# Patient Record
Sex: FEMALE | Employment: UNEMPLOYED | URBAN - METROPOLITAN AREA
[De-identification: names, ages, dates, MRNs, and addresses within clinical notes are randomized per-mention and may not be internally consistent; named-entity substitution may affect disease eponyms.]

---

## 2019-09-26 ENCOUNTER — HOSPITAL ENCOUNTER (INPATIENT)
Age: 84
LOS: 6 days | Discharge: NURSING, REHAB OR LONG-TERM CARE | DRG: 078 | End: 2019-10-02
Attending: EMERGENCY MEDICINE | Admitting: INTERNAL MEDICINE
Payer: MEDICARE

## 2019-09-26 ENCOUNTER — APPOINTMENT (OUTPATIENT)
Dept: CT IMAGING | Age: 84
DRG: 078 | End: 2019-09-26
Payer: MEDICARE

## 2019-09-26 ENCOUNTER — APPOINTMENT (OUTPATIENT)
Dept: GENERAL RADIOLOGY | Age: 84
DRG: 078 | End: 2019-09-26
Payer: MEDICARE

## 2019-09-26 DIAGNOSIS — I16.0 HYPERTENSIVE URGENCY: Primary | ICD-10-CM

## 2019-09-26 PROBLEM — I10 UNCONTROLLED HYPERTENSION: Status: ACTIVE | Noted: 2019-09-26

## 2019-09-26 LAB
A/G RATIO: 1.5 (ref 1.1–2.2)
ALBUMIN SERPL-MCNC: 4.5 G/DL (ref 3.4–5)
ALP BLD-CCNC: 103 U/L (ref 40–129)
ALT SERPL-CCNC: 15 U/L (ref 10–40)
ANION GAP SERPL CALCULATED.3IONS-SCNC: 18 MMOL/L (ref 3–16)
AST SERPL-CCNC: 22 U/L (ref 15–37)
BASOPHILS ABSOLUTE: 0 K/UL (ref 0–0.2)
BASOPHILS RELATIVE PERCENT: 0.3 %
BILIRUB SERPL-MCNC: 0.3 MG/DL (ref 0–1)
BILIRUBIN URINE: NEGATIVE
BLOOD, URINE: ABNORMAL
BUN BLDV-MCNC: 47 MG/DL (ref 7–20)
CALCIUM SERPL-MCNC: 9.7 MG/DL (ref 8.3–10.6)
CHLORIDE BLD-SCNC: 105 MMOL/L (ref 99–110)
CLARITY: CLEAR
CO2: 24 MMOL/L (ref 21–32)
COLOR: YELLOW
CREAT SERPL-MCNC: 1.6 MG/DL (ref 0.6–1.2)
EOSINOPHILS ABSOLUTE: 0 K/UL (ref 0–0.6)
EOSINOPHILS RELATIVE PERCENT: 0.2 %
EPITHELIAL CELLS, UA: 1 /HPF (ref 0–5)
GFR AFRICAN AMERICAN: 37
GFR NON-AFRICAN AMERICAN: 30
GLOBULIN: 3.1 G/DL
GLUCOSE BLD-MCNC: 157 MG/DL (ref 70–99)
GLUCOSE URINE: NEGATIVE MG/DL
HCT VFR BLD CALC: 34.9 % (ref 36–48)
HEMOGLOBIN: 11.8 G/DL (ref 12–16)
HYALINE CASTS: 1 /LPF (ref 0–8)
KETONES, URINE: NEGATIVE MG/DL
LACTIC ACID: 1.3 MMOL/L (ref 0.4–2)
LEUKOCYTE ESTERASE, URINE: NEGATIVE
LYMPHOCYTES ABSOLUTE: 0.7 K/UL (ref 1–5.1)
LYMPHOCYTES RELATIVE PERCENT: 11.9 %
MCH RBC QN AUTO: 32 PG (ref 26–34)
MCHC RBC AUTO-ENTMCNC: 33.8 G/DL (ref 31–36)
MCV RBC AUTO: 94.6 FL (ref 80–100)
MICROSCOPIC EXAMINATION: YES
MONOCYTES ABSOLUTE: 0.3 K/UL (ref 0–1.3)
MONOCYTES RELATIVE PERCENT: 5 %
NEUTROPHILS ABSOLUTE: 4.8 K/UL (ref 1.7–7.7)
NEUTROPHILS RELATIVE PERCENT: 82.6 %
NITRITE, URINE: NEGATIVE
PDW BLD-RTO: 13.7 % (ref 12.4–15.4)
PH UA: 6.5 (ref 5–8)
PLATELET # BLD: 147 K/UL (ref 135–450)
PMV BLD AUTO: 9.6 FL (ref 5–10.5)
POTASSIUM REFLEX MAGNESIUM: 4.8 MMOL/L (ref 3.5–5.1)
PRO-BNP: 1369 PG/ML (ref 0–449)
PROTEIN UA: >=300 MG/DL
RBC # BLD: 3.68 M/UL (ref 4–5.2)
RBC UA: 14 /HPF (ref 0–4)
SODIUM BLD-SCNC: 147 MMOL/L (ref 136–145)
SPECIFIC GRAVITY UA: 1.02 (ref 1–1.03)
TOTAL CK: 108 U/L (ref 26–192)
TOTAL PROTEIN: 7.6 G/DL (ref 6.4–8.2)
TROPONIN: 0.02 NG/ML
URINE REFLEX TO CULTURE: ABNORMAL
URINE TYPE: ABNORMAL
UROBILINOGEN, URINE: 1 E.U./DL
WBC # BLD: 5.9 K/UL (ref 4–11)
WBC UA: 1 /HPF (ref 0–5)

## 2019-09-26 PROCEDURE — 83880 ASSAY OF NATRIURETIC PEPTIDE: CPT

## 2019-09-26 PROCEDURE — 2500000003 HC RX 250 WO HCPCS: Performed by: EMERGENCY MEDICINE

## 2019-09-26 PROCEDURE — 81001 URINALYSIS AUTO W/SCOPE: CPT

## 2019-09-26 PROCEDURE — 70450 CT HEAD/BRAIN W/O DYE: CPT

## 2019-09-26 PROCEDURE — 84484 ASSAY OF TROPONIN QUANT: CPT

## 2019-09-26 PROCEDURE — 83605 ASSAY OF LACTIC ACID: CPT

## 2019-09-26 PROCEDURE — 6370000000 HC RX 637 (ALT 250 FOR IP): Performed by: EMERGENCY MEDICINE

## 2019-09-26 PROCEDURE — 1200000000 HC SEMI PRIVATE

## 2019-09-26 PROCEDURE — 85025 COMPLETE CBC W/AUTO DIFF WBC: CPT

## 2019-09-26 PROCEDURE — 99285 EMERGENCY DEPT VISIT HI MDM: CPT

## 2019-09-26 PROCEDURE — 71045 X-RAY EXAM CHEST 1 VIEW: CPT

## 2019-09-26 PROCEDURE — 96374 THER/PROPH/DIAG INJ IV PUSH: CPT

## 2019-09-26 PROCEDURE — 80053 COMPREHEN METABOLIC PANEL: CPT

## 2019-09-26 PROCEDURE — 82550 ASSAY OF CK (CPK): CPT

## 2019-09-26 PROCEDURE — 93005 ELECTROCARDIOGRAM TRACING: CPT | Performed by: EMERGENCY MEDICINE

## 2019-09-26 RX ORDER — AMLODIPINE BESYLATE 5 MG/1
10 TABLET ORAL ONCE
Status: COMPLETED | OUTPATIENT
Start: 2019-09-26 | End: 2019-09-26

## 2019-09-26 RX ORDER — LISINOPRIL 10 MG/1
10 TABLET ORAL ONCE
Status: DISCONTINUED | OUTPATIENT
Start: 2019-09-26 | End: 2019-09-26

## 2019-09-26 RX ORDER — AMLODIPINE BESYLATE 5 MG/1
10 TABLET ORAL DAILY
Status: DISCONTINUED | OUTPATIENT
Start: 2019-09-27 | End: 2019-09-26

## 2019-09-26 RX ORDER — LABETALOL HYDROCHLORIDE 5 MG/ML
20 INJECTION, SOLUTION INTRAVENOUS ONCE
Status: COMPLETED | OUTPATIENT
Start: 2019-09-26 | End: 2019-09-26

## 2019-09-26 RX ORDER — HYDRALAZINE HYDROCHLORIDE 20 MG/ML
5 INJECTION INTRAMUSCULAR; INTRAVENOUS ONCE
Status: DISCONTINUED | OUTPATIENT
Start: 2019-09-26 | End: 2019-09-26

## 2019-09-26 RX ADMIN — AMLODIPINE BESYLATE 10 MG: 5 TABLET ORAL at 22:49

## 2019-09-26 RX ADMIN — LABETALOL HYDROCHLORIDE 20 MG: 5 INJECTION, SOLUTION INTRAVENOUS at 22:00

## 2019-09-27 PROBLEM — I16.0 HYPERTENSIVE URGENCY: Status: ACTIVE | Noted: 2019-09-27

## 2019-09-27 LAB
ANION GAP SERPL CALCULATED.3IONS-SCNC: 17 MMOL/L (ref 3–16)
BUN BLDV-MCNC: 41 MG/DL (ref 7–20)
CALCIUM SERPL-MCNC: 9.9 MG/DL (ref 8.3–10.6)
CHLORIDE BLD-SCNC: 102 MMOL/L (ref 99–110)
CO2: 24 MMOL/L (ref 21–32)
CREAT SERPL-MCNC: 1.2 MG/DL (ref 0.6–1.2)
EKG ATRIAL RATE: 82 BPM
EKG ATRIAL RATE: 89 BPM
EKG DIAGNOSIS: NORMAL
EKG DIAGNOSIS: NORMAL
EKG P AXIS: 29 DEGREES
EKG P AXIS: 43 DEGREES
EKG P-R INTERVAL: 162 MS
EKG P-R INTERVAL: 180 MS
EKG Q-T INTERVAL: 408 MS
EKG Q-T INTERVAL: 446 MS
EKG QRS DURATION: 84 MS
EKG QRS DURATION: 90 MS
EKG QTC CALCULATION (BAZETT): 496 MS
EKG QTC CALCULATION (BAZETT): 521 MS
EKG R AXIS: 32 DEGREES
EKG R AXIS: 51 DEGREES
EKG T AXIS: -38 DEGREES
EKG T AXIS: 12 DEGREES
EKG VENTRICULAR RATE: 82 BPM
EKG VENTRICULAR RATE: 89 BPM
GFR AFRICAN AMERICAN: 51
GFR NON-AFRICAN AMERICAN: 42
GLUCOSE BLD-MCNC: 170 MG/DL (ref 70–99)
POTASSIUM REFLEX MAGNESIUM: 3.8 MMOL/L (ref 3.5–5.1)
SODIUM BLD-SCNC: 143 MMOL/L (ref 136–145)
TROPONIN: 0.06 NG/ML
TROPONIN: 0.07 NG/ML

## 2019-09-27 PROCEDURE — 92610 EVALUATE SWALLOWING FUNCTION: CPT

## 2019-09-27 PROCEDURE — 93010 ELECTROCARDIOGRAM REPORT: CPT | Performed by: INTERNAL MEDICINE

## 2019-09-27 PROCEDURE — 6360000002 HC RX W HCPCS: Performed by: INTERNAL MEDICINE

## 2019-09-27 PROCEDURE — 93005 ELECTROCARDIOGRAM TRACING: CPT | Performed by: NURSE PRACTITIONER

## 2019-09-27 PROCEDURE — 36415 COLL VENOUS BLD VENIPUNCTURE: CPT

## 2019-09-27 PROCEDURE — 94760 N-INVAS EAR/PLS OXIMETRY 1: CPT

## 2019-09-27 PROCEDURE — 2580000003 HC RX 258: Performed by: INTERNAL MEDICINE

## 2019-09-27 PROCEDURE — 6370000000 HC RX 637 (ALT 250 FOR IP): Performed by: INTERNAL MEDICINE

## 2019-09-27 PROCEDURE — 1200000000 HC SEMI PRIVATE

## 2019-09-27 PROCEDURE — 6370000000 HC RX 637 (ALT 250 FOR IP): Performed by: NURSE PRACTITIONER

## 2019-09-27 PROCEDURE — 80048 BASIC METABOLIC PNL TOTAL CA: CPT

## 2019-09-27 PROCEDURE — 99223 1ST HOSP IP/OBS HIGH 75: CPT | Performed by: INTERNAL MEDICINE

## 2019-09-27 PROCEDURE — 84484 ASSAY OF TROPONIN QUANT: CPT

## 2019-09-27 RX ORDER — ONDANSETRON 2 MG/ML
4 INJECTION INTRAMUSCULAR; INTRAVENOUS EVERY 6 HOURS PRN
Status: DISCONTINUED | OUTPATIENT
Start: 2019-09-27 | End: 2019-10-02 | Stop reason: HOSPADM

## 2019-09-27 RX ORDER — HYDRALAZINE HYDROCHLORIDE 20 MG/ML
10 INJECTION INTRAMUSCULAR; INTRAVENOUS EVERY 6 HOURS PRN
Status: DISCONTINUED | OUTPATIENT
Start: 2019-09-27 | End: 2019-09-30

## 2019-09-27 RX ORDER — LABETALOL 100 MG/1
50 TABLET, FILM COATED ORAL EVERY 8 HOURS SCHEDULED
Status: DISCONTINUED | OUTPATIENT
Start: 2019-09-27 | End: 2019-09-27

## 2019-09-27 RX ORDER — LABETALOL HYDROCHLORIDE 5 MG/ML
10 INJECTION, SOLUTION INTRAVENOUS EVERY 4 HOURS PRN
Status: DISCONTINUED | OUTPATIENT
Start: 2019-09-27 | End: 2019-09-30

## 2019-09-27 RX ORDER — ACETAMINOPHEN 325 MG/1
650 TABLET ORAL EVERY 4 HOURS PRN
Status: DISCONTINUED | OUTPATIENT
Start: 2019-09-27 | End: 2019-10-02 | Stop reason: HOSPADM

## 2019-09-27 RX ORDER — LABETALOL 100 MG/1
100 TABLET, FILM COATED ORAL EVERY 12 HOURS SCHEDULED
Status: DISCONTINUED | OUTPATIENT
Start: 2019-09-27 | End: 2019-10-01

## 2019-09-27 RX ORDER — SODIUM CHLORIDE 0.9 % (FLUSH) 0.9 %
10 SYRINGE (ML) INJECTION EVERY 12 HOURS SCHEDULED
Status: DISCONTINUED | OUTPATIENT
Start: 2019-09-27 | End: 2019-10-02 | Stop reason: HOSPADM

## 2019-09-27 RX ORDER — LABETALOL 100 MG/1
25 TABLET, FILM COATED ORAL EVERY 8 HOURS SCHEDULED
Status: DISCONTINUED | OUTPATIENT
Start: 2019-09-27 | End: 2019-09-27 | Stop reason: DRUGHIGH

## 2019-09-27 RX ORDER — AMLODIPINE BESYLATE 10 MG/1
10 TABLET ORAL DAILY
Status: DISCONTINUED | OUTPATIENT
Start: 2019-09-27 | End: 2019-09-27

## 2019-09-27 RX ORDER — AMLODIPINE BESYLATE 5 MG/1
5 TABLET ORAL DAILY
Status: DISCONTINUED | OUTPATIENT
Start: 2019-09-28 | End: 2019-10-01

## 2019-09-27 RX ORDER — FLUOXETINE 10 MG/1
10 CAPSULE ORAL DAILY
Status: DISCONTINUED | OUTPATIENT
Start: 2019-09-27 | End: 2019-10-02 | Stop reason: HOSPADM

## 2019-09-27 RX ORDER — SODIUM CHLORIDE 0.9 % (FLUSH) 0.9 %
10 SYRINGE (ML) INJECTION PRN
Status: DISCONTINUED | OUTPATIENT
Start: 2019-09-27 | End: 2019-10-02 | Stop reason: HOSPADM

## 2019-09-27 RX ORDER — QUETIAPINE FUMARATE 25 MG/1
12.5 TABLET, FILM COATED ORAL 2 TIMES DAILY PRN
Status: DISCONTINUED | OUTPATIENT
Start: 2019-09-27 | End: 2019-09-30

## 2019-09-27 RX ORDER — LABETALOL 100 MG/1
50 TABLET, FILM COATED ORAL EVERY 12 HOURS SCHEDULED
Status: DISCONTINUED | OUTPATIENT
Start: 2019-09-27 | End: 2019-09-27

## 2019-09-27 RX ORDER — ASPIRIN 81 MG/1
81 TABLET, CHEWABLE ORAL DAILY
Status: DISCONTINUED | OUTPATIENT
Start: 2019-09-27 | End: 2019-10-02 | Stop reason: HOSPADM

## 2019-09-27 RX ORDER — QUETIAPINE FUMARATE 25 MG/1
25 TABLET, FILM COATED ORAL NIGHTLY
Status: DISCONTINUED | OUTPATIENT
Start: 2019-09-27 | End: 2019-09-30

## 2019-09-27 RX ADMIN — HYDRALAZINE HYDROCHLORIDE 10 MG: 20 INJECTION INTRAMUSCULAR; INTRAVENOUS at 18:36

## 2019-09-27 RX ADMIN — AMLODIPINE BESYLATE 10 MG: 10 TABLET ORAL at 09:41

## 2019-09-27 RX ADMIN — LABETALOL HYDROCHLORIDE 100 MG: 100 TABLET, FILM COATED ORAL at 20:50

## 2019-09-27 RX ADMIN — HYDRALAZINE HYDROCHLORIDE 10 MG: 20 INJECTION INTRAMUSCULAR; INTRAVENOUS at 01:14

## 2019-09-27 RX ADMIN — FLUOXETINE 10 MG: 10 CAPSULE ORAL at 20:50

## 2019-09-27 RX ADMIN — Medication 10 ML: at 09:42

## 2019-09-27 RX ADMIN — Medication 10 ML: at 20:57

## 2019-09-27 RX ADMIN — METOPROLOL TARTRATE 25 MG: 25 TABLET ORAL at 09:41

## 2019-09-27 RX ADMIN — QUETIAPINE FUMARATE 25 MG: 25 TABLET ORAL at 20:50

## 2019-09-27 RX ADMIN — ASPIRIN 81 MG 81 MG: 81 TABLET ORAL at 09:41

## 2019-09-27 ASSESSMENT — PAIN SCALES - WONG BAKER
WONGBAKER_NUMERICALRESPONSE: 0
WONGBAKER_NUMERICALRESPONSE: 0

## 2019-09-28 LAB
ANION GAP SERPL CALCULATED.3IONS-SCNC: 16 MMOL/L (ref 3–16)
BASOPHILS ABSOLUTE: 0 K/UL (ref 0–0.2)
BASOPHILS RELATIVE PERCENT: 0.5 %
BUN BLDV-MCNC: 44 MG/DL (ref 7–20)
CALCIUM SERPL-MCNC: 9.6 MG/DL (ref 8.3–10.6)
CHLORIDE BLD-SCNC: 107 MMOL/L (ref 99–110)
CO2: 23 MMOL/L (ref 21–32)
CREAT SERPL-MCNC: 1.8 MG/DL (ref 0.6–1.2)
EOSINOPHILS ABSOLUTE: 0 K/UL (ref 0–0.6)
EOSINOPHILS RELATIVE PERCENT: 0.5 %
GFR AFRICAN AMERICAN: 32
GFR NON-AFRICAN AMERICAN: 26
GLUCOSE BLD-MCNC: 89 MG/DL (ref 70–99)
HCT VFR BLD CALC: 35.2 % (ref 36–48)
HEMOGLOBIN: 11.8 G/DL (ref 12–16)
LYMPHOCYTES ABSOLUTE: 1.5 K/UL (ref 1–5.1)
LYMPHOCYTES RELATIVE PERCENT: 24.5 %
MCH RBC QN AUTO: 31.9 PG (ref 26–34)
MCHC RBC AUTO-ENTMCNC: 33.4 G/DL (ref 31–36)
MCV RBC AUTO: 95.6 FL (ref 80–100)
MONOCYTES ABSOLUTE: 0.4 K/UL (ref 0–1.3)
MONOCYTES RELATIVE PERCENT: 7.1 %
NEUTROPHILS ABSOLUTE: 4.2 K/UL (ref 1.7–7.7)
NEUTROPHILS RELATIVE PERCENT: 67.4 %
PDW BLD-RTO: 13.9 % (ref 12.4–15.4)
PLATELET # BLD: 157 K/UL (ref 135–450)
PMV BLD AUTO: 9.6 FL (ref 5–10.5)
POTASSIUM REFLEX MAGNESIUM: 4.2 MMOL/L (ref 3.5–5.1)
RBC # BLD: 3.68 M/UL (ref 4–5.2)
SODIUM BLD-SCNC: 146 MMOL/L (ref 136–145)
WBC # BLD: 6.2 K/UL (ref 4–11)

## 2019-09-28 PROCEDURE — 6370000000 HC RX 637 (ALT 250 FOR IP): Performed by: INTERNAL MEDICINE

## 2019-09-28 PROCEDURE — 6370000000 HC RX 637 (ALT 250 FOR IP): Performed by: NURSE PRACTITIONER

## 2019-09-28 PROCEDURE — 2580000003 HC RX 258: Performed by: INTERNAL MEDICINE

## 2019-09-28 PROCEDURE — 97162 PT EVAL MOD COMPLEX 30 MIN: CPT

## 2019-09-28 PROCEDURE — 1200000000 HC SEMI PRIVATE

## 2019-09-28 PROCEDURE — 36415 COLL VENOUS BLD VENIPUNCTURE: CPT

## 2019-09-28 PROCEDURE — 97530 THERAPEUTIC ACTIVITIES: CPT

## 2019-09-28 PROCEDURE — 80048 BASIC METABOLIC PNL TOTAL CA: CPT

## 2019-09-28 PROCEDURE — 6360000002 HC RX W HCPCS: Performed by: NURSE PRACTITIONER

## 2019-09-28 PROCEDURE — 97166 OT EVAL MOD COMPLEX 45 MIN: CPT

## 2019-09-28 PROCEDURE — 6360000002 HC RX W HCPCS: Performed by: INTERNAL MEDICINE

## 2019-09-28 PROCEDURE — 93308 TTE F-UP OR LMTD: CPT

## 2019-09-28 PROCEDURE — 85025 COMPLETE CBC W/AUTO DIFF WBC: CPT

## 2019-09-28 RX ORDER — POTASSIUM CHLORIDE AND SODIUM CHLORIDE 450; 150 MG/100ML; MG/100ML
INJECTION, SOLUTION INTRAVENOUS CONTINUOUS
Status: DISCONTINUED | OUTPATIENT
Start: 2019-09-28 | End: 2019-09-30

## 2019-09-28 RX ADMIN — ENOXAPARIN SODIUM 30 MG: 30 INJECTION SUBCUTANEOUS at 09:51

## 2019-09-28 RX ADMIN — QUETIAPINE FUMARATE 25 MG: 25 TABLET ORAL at 21:39

## 2019-09-28 RX ADMIN — LABETALOL HYDROCHLORIDE 100 MG: 100 TABLET, FILM COATED ORAL at 09:51

## 2019-09-28 RX ADMIN — FLUOXETINE 10 MG: 10 CAPSULE ORAL at 09:56

## 2019-09-28 RX ADMIN — LABETALOL HYDROCHLORIDE 100 MG: 100 TABLET, FILM COATED ORAL at 21:39

## 2019-09-28 RX ADMIN — AMLODIPINE BESYLATE 5 MG: 5 TABLET ORAL at 09:50

## 2019-09-28 RX ADMIN — Medication 10 ML: at 09:52

## 2019-09-28 RX ADMIN — ASPIRIN 81 MG 81 MG: 81 TABLET ORAL at 09:51

## 2019-09-28 RX ADMIN — POTASSIUM CHLORIDE AND SODIUM CHLORIDE: 450; 150 INJECTION, SOLUTION INTRAVENOUS at 09:51

## 2019-09-28 ASSESSMENT — PAIN SCALES - WONG BAKER: WONGBAKER_NUMERICALRESPONSE: 0

## 2019-09-29 LAB
ANION GAP SERPL CALCULATED.3IONS-SCNC: 16 MMOL/L (ref 3–16)
BUN BLDV-MCNC: 53 MG/DL (ref 7–20)
CALCIUM SERPL-MCNC: 8.9 MG/DL (ref 8.3–10.6)
CHLORIDE BLD-SCNC: 107 MMOL/L (ref 99–110)
CO2: 21 MMOL/L (ref 21–32)
CREAT SERPL-MCNC: 1.7 MG/DL (ref 0.6–1.2)
GFR AFRICAN AMERICAN: 34
GFR NON-AFRICAN AMERICAN: 28
GLUCOSE BLD-MCNC: 94 MG/DL (ref 70–99)
POTASSIUM REFLEX MAGNESIUM: 4.2 MMOL/L (ref 3.5–5.1)
SODIUM BLD-SCNC: 144 MMOL/L (ref 136–145)

## 2019-09-29 PROCEDURE — 6360000002 HC RX W HCPCS: Performed by: INTERNAL MEDICINE

## 2019-09-29 PROCEDURE — 36415 COLL VENOUS BLD VENIPUNCTURE: CPT

## 2019-09-29 PROCEDURE — 6370000000 HC RX 637 (ALT 250 FOR IP): Performed by: INTERNAL MEDICINE

## 2019-09-29 PROCEDURE — 2580000003 HC RX 258: Performed by: INTERNAL MEDICINE

## 2019-09-29 PROCEDURE — 80048 BASIC METABOLIC PNL TOTAL CA: CPT

## 2019-09-29 PROCEDURE — 94760 N-INVAS EAR/PLS OXIMETRY 1: CPT

## 2019-09-29 PROCEDURE — 6370000000 HC RX 637 (ALT 250 FOR IP): Performed by: NURSE PRACTITIONER

## 2019-09-29 PROCEDURE — 1200000000 HC SEMI PRIVATE

## 2019-09-29 RX ORDER — AMLODIPINE BESYLATE 5 MG/1
10 TABLET ORAL DAILY
Qty: 30 TABLET | Refills: 0 | Status: SHIPPED | OUTPATIENT
Start: 2019-09-30 | End: 2019-10-02 | Stop reason: HOSPADM

## 2019-09-29 RX ORDER — FLUOXETINE 10 MG/1
10 CAPSULE ORAL DAILY
Qty: 30 CAPSULE | Refills: 0 | Status: SHIPPED | OUTPATIENT
Start: 2019-09-30 | End: 2020-01-07 | Stop reason: DRUGHIGH

## 2019-09-29 RX ORDER — QUETIAPINE FUMARATE 25 MG/1
25 TABLET, FILM COATED ORAL NIGHTLY
Qty: 30 TABLET | Refills: 0 | Status: SHIPPED | OUTPATIENT
Start: 2019-09-29 | End: 2020-01-07 | Stop reason: SDUPTHER

## 2019-09-29 RX ORDER — LABETALOL 100 MG/1
100 TABLET, FILM COATED ORAL EVERY 12 HOURS SCHEDULED
Qty: 60 TABLET | Refills: 0 | Status: SHIPPED | OUTPATIENT
Start: 2019-09-29 | End: 2019-10-02 | Stop reason: HOSPADM

## 2019-09-29 RX ADMIN — QUETIAPINE FUMARATE 25 MG: 25 TABLET ORAL at 19:45

## 2019-09-29 RX ADMIN — LABETALOL HYDROCHLORIDE 100 MG: 100 TABLET, FILM COATED ORAL at 22:10

## 2019-09-29 RX ADMIN — ENOXAPARIN SODIUM 30 MG: 30 INJECTION SUBCUTANEOUS at 09:42

## 2019-09-29 RX ADMIN — FLUOXETINE 10 MG: 10 CAPSULE ORAL at 09:41

## 2019-09-29 RX ADMIN — AMLODIPINE BESYLATE 5 MG: 5 TABLET ORAL at 09:42

## 2019-09-29 RX ADMIN — HYDRALAZINE HYDROCHLORIDE 10 MG: 20 INJECTION INTRAMUSCULAR; INTRAVENOUS at 05:15

## 2019-09-29 RX ADMIN — ASPIRIN 81 MG 81 MG: 81 TABLET ORAL at 09:41

## 2019-09-29 RX ADMIN — Medication 10 ML: at 09:41

## 2019-09-29 RX ADMIN — LABETALOL HYDROCHLORIDE 100 MG: 100 TABLET, FILM COATED ORAL at 09:40

## 2019-09-29 ASSESSMENT — PAIN SCALES - WONG BAKER
WONGBAKER_NUMERICALRESPONSE: 0
WONGBAKER_NUMERICALRESPONSE: 0

## 2019-09-30 PROCEDURE — 6370000000 HC RX 637 (ALT 250 FOR IP): Performed by: NURSE PRACTITIONER

## 2019-09-30 PROCEDURE — 6370000000 HC RX 637 (ALT 250 FOR IP): Performed by: INTERNAL MEDICINE

## 2019-09-30 PROCEDURE — 97116 GAIT TRAINING THERAPY: CPT

## 2019-09-30 PROCEDURE — 97530 THERAPEUTIC ACTIVITIES: CPT

## 2019-09-30 PROCEDURE — 94760 N-INVAS EAR/PLS OXIMETRY 1: CPT

## 2019-09-30 PROCEDURE — 92526 ORAL FUNCTION THERAPY: CPT

## 2019-09-30 PROCEDURE — 6360000002 HC RX W HCPCS: Performed by: INTERNAL MEDICINE

## 2019-09-30 PROCEDURE — 1200000000 HC SEMI PRIVATE

## 2019-09-30 PROCEDURE — 6360000002 HC RX W HCPCS: Performed by: NURSE PRACTITIONER

## 2019-09-30 PROCEDURE — 2580000003 HC RX 258: Performed by: INTERNAL MEDICINE

## 2019-09-30 RX ORDER — QUETIAPINE FUMARATE 25 MG/1
50 TABLET, FILM COATED ORAL NIGHTLY
Status: DISCONTINUED | OUTPATIENT
Start: 2019-09-30 | End: 2019-10-02 | Stop reason: HOSPADM

## 2019-09-30 RX ORDER — QUETIAPINE FUMARATE 25 MG/1
12.5 TABLET, FILM COATED ORAL NIGHTLY PRN
Status: DISCONTINUED | OUTPATIENT
Start: 2019-09-30 | End: 2019-09-30

## 2019-09-30 RX ADMIN — LABETALOL HYDROCHLORIDE 100 MG: 100 TABLET, FILM COATED ORAL at 08:45

## 2019-09-30 RX ADMIN — POTASSIUM CHLORIDE AND SODIUM CHLORIDE: 450; 150 INJECTION, SOLUTION INTRAVENOUS at 06:28

## 2019-09-30 RX ADMIN — HYDRALAZINE HYDROCHLORIDE 10 MG: 20 INJECTION INTRAMUSCULAR; INTRAVENOUS at 09:55

## 2019-09-30 RX ADMIN — QUETIAPINE FUMARATE 12.5 MG: 25 TABLET ORAL at 16:28

## 2019-09-30 RX ADMIN — FLUOXETINE 10 MG: 10 CAPSULE ORAL at 08:47

## 2019-09-30 RX ADMIN — ENOXAPARIN SODIUM 30 MG: 30 INJECTION SUBCUTANEOUS at 08:46

## 2019-09-30 RX ADMIN — Medication 10 ML: at 20:18

## 2019-09-30 RX ADMIN — QUETIAPINE FUMARATE 50 MG: 25 TABLET ORAL at 20:05

## 2019-09-30 RX ADMIN — AMLODIPINE BESYLATE 5 MG: 5 TABLET ORAL at 08:45

## 2019-09-30 RX ADMIN — LABETALOL HYDROCHLORIDE 100 MG: 100 TABLET, FILM COATED ORAL at 20:05

## 2019-09-30 RX ADMIN — ASPIRIN 81 MG 81 MG: 81 TABLET ORAL at 08:45

## 2019-09-30 ASSESSMENT — PAIN SCALES - WONG BAKER
WONGBAKER_NUMERICALRESPONSE: 0

## 2019-10-01 PROCEDURE — 6360000002 HC RX W HCPCS: Performed by: INTERNAL MEDICINE

## 2019-10-01 PROCEDURE — 92526 ORAL FUNCTION THERAPY: CPT

## 2019-10-01 PROCEDURE — 6370000000 HC RX 637 (ALT 250 FOR IP): Performed by: NURSE PRACTITIONER

## 2019-10-01 PROCEDURE — 1200000000 HC SEMI PRIVATE

## 2019-10-01 PROCEDURE — 94760 N-INVAS EAR/PLS OXIMETRY 1: CPT

## 2019-10-01 PROCEDURE — 6370000000 HC RX 637 (ALT 250 FOR IP): Performed by: INTERNAL MEDICINE

## 2019-10-01 PROCEDURE — 2580000003 HC RX 258: Performed by: INTERNAL MEDICINE

## 2019-10-01 RX ORDER — NIFEDIPINE 30 MG/1
30 TABLET, EXTENDED RELEASE ORAL DAILY
Status: DISCONTINUED | OUTPATIENT
Start: 2019-10-01 | End: 2019-10-02

## 2019-10-01 RX ADMIN — FLUOXETINE 10 MG: 10 CAPSULE ORAL at 08:22

## 2019-10-01 RX ADMIN — AMLODIPINE BESYLATE 5 MG: 5 TABLET ORAL at 08:22

## 2019-10-01 RX ADMIN — ENOXAPARIN SODIUM 30 MG: 30 INJECTION SUBCUTANEOUS at 08:22

## 2019-10-01 RX ADMIN — LABETALOL HYDROCHLORIDE 100 MG: 100 TABLET, FILM COATED ORAL at 08:22

## 2019-10-01 RX ADMIN — QUETIAPINE FUMARATE 50 MG: 25 TABLET ORAL at 21:51

## 2019-10-01 RX ADMIN — Medication 10 ML: at 21:51

## 2019-10-01 RX ADMIN — ASPIRIN 81 MG 81 MG: 81 TABLET ORAL at 08:22

## 2019-10-01 RX ADMIN — Medication 10 ML: at 08:23

## 2019-10-01 ASSESSMENT — PAIN SCALES - WONG BAKER
WONGBAKER_NUMERICALRESPONSE: 0

## 2019-10-01 ASSESSMENT — PAIN SCALES - GENERAL
PAINLEVEL_OUTOF10: 0
PAINLEVEL_OUTOF10: 0

## 2019-10-02 VITALS
HEART RATE: 67 BPM | SYSTOLIC BLOOD PRESSURE: 153 MMHG | TEMPERATURE: 98.4 F | RESPIRATION RATE: 16 BRPM | DIASTOLIC BLOOD PRESSURE: 74 MMHG | OXYGEN SATURATION: 94 % | BODY MASS INDEX: 17.08 KG/M2 | WEIGHT: 92.81 LBS | HEIGHT: 62 IN

## 2019-10-02 PROCEDURE — 6360000002 HC RX W HCPCS: Performed by: INTERNAL MEDICINE

## 2019-10-02 PROCEDURE — 94760 N-INVAS EAR/PLS OXIMETRY 1: CPT

## 2019-10-02 PROCEDURE — 6370000000 HC RX 637 (ALT 250 FOR IP): Performed by: NURSE PRACTITIONER

## 2019-10-02 PROCEDURE — 6370000000 HC RX 637 (ALT 250 FOR IP): Performed by: INTERNAL MEDICINE

## 2019-10-02 RX ORDER — QUETIAPINE FUMARATE 25 MG/1
25 TABLET, FILM COATED ORAL NIGHTLY PRN
Qty: 60 TABLET | Refills: 0
Start: 2019-10-02 | End: 2020-01-07 | Stop reason: DRUGHIGH

## 2019-10-02 RX ORDER — NIFEDIPINE 30 MG/1
30 TABLET, EXTENDED RELEASE ORAL DAILY
Status: DISCONTINUED | OUTPATIENT
Start: 2019-10-03 | End: 2019-10-02 | Stop reason: HOSPADM

## 2019-10-02 RX ORDER — NIFEDIPINE 60 MG/1
60 TABLET, FILM COATED, EXTENDED RELEASE ORAL DAILY
Qty: 30 TABLET | Refills: 3
Start: 2019-10-03 | End: 2019-10-02 | Stop reason: HOSPADM

## 2019-10-02 RX ORDER — NIFEDIPINE 30 MG/1
30 TABLET, FILM COATED, EXTENDED RELEASE ORAL DAILY
Qty: 30 TABLET | Refills: 3
Start: 2019-10-03

## 2019-10-02 RX ORDER — NIFEDIPINE 60 MG/1
60 TABLET, EXTENDED RELEASE ORAL DAILY
Status: DISCONTINUED | OUTPATIENT
Start: 2019-10-03 | End: 2019-10-02

## 2019-10-02 RX ADMIN — FLUOXETINE 10 MG: 10 CAPSULE ORAL at 08:19

## 2019-10-02 RX ADMIN — NIFEDIPINE 30 MG: 30 TABLET, FILM COATED, EXTENDED RELEASE ORAL at 08:19

## 2019-10-02 RX ADMIN — ENOXAPARIN SODIUM 30 MG: 30 INJECTION SUBCUTANEOUS at 08:19

## 2019-10-02 RX ADMIN — ASPIRIN 81 MG 81 MG: 81 TABLET ORAL at 08:19

## 2019-10-02 ASSESSMENT — PAIN SCALES - WONG BAKER: WONGBAKER_NUMERICALRESPONSE: 0

## 2019-10-02 ASSESSMENT — PAIN SCALES - GENERAL: PAINLEVEL_OUTOF10: 0

## 2020-01-07 ENCOUNTER — APPOINTMENT (OUTPATIENT)
Dept: GENERAL RADIOLOGY | Age: 85
DRG: 689 | End: 2020-01-07
Payer: MEDICARE

## 2020-01-07 ENCOUNTER — APPOINTMENT (OUTPATIENT)
Dept: CT IMAGING | Age: 85
DRG: 689 | End: 2020-01-07
Payer: MEDICARE

## 2020-01-07 ENCOUNTER — HOSPITAL ENCOUNTER (INPATIENT)
Age: 85
LOS: 1 days | Discharge: NURSING, REHAB OR LONG-TERM CARE | DRG: 689 | End: 2020-01-09
Attending: EMERGENCY MEDICINE | Admitting: INTERNAL MEDICINE
Payer: MEDICARE

## 2020-01-07 PROBLEM — I63.9 ACUTE CVA (CEREBROVASCULAR ACCIDENT) (HCC): Status: ACTIVE | Noted: 2020-01-07

## 2020-01-07 LAB
A/G RATIO: 1.3 (ref 1.1–2.2)
ALBUMIN SERPL-MCNC: 3.7 G/DL (ref 3.4–5)
ALP BLD-CCNC: 118 U/L (ref 40–129)
ALT SERPL-CCNC: 9 U/L (ref 10–40)
ANION GAP SERPL CALCULATED.3IONS-SCNC: 14 MMOL/L (ref 3–16)
APTT: 29 SEC (ref 24.2–36.2)
AST SERPL-CCNC: 13 U/L (ref 15–37)
BACTERIA: ABNORMAL /HPF
BASOPHILS ABSOLUTE: 0.1 K/UL (ref 0–0.2)
BASOPHILS RELATIVE PERCENT: 1 %
BILIRUB SERPL-MCNC: <0.2 MG/DL (ref 0–1)
BILIRUBIN URINE: NEGATIVE
BLOOD, URINE: ABNORMAL
BUN BLDV-MCNC: 37 MG/DL (ref 7–20)
CALCIUM SERPL-MCNC: 8.9 MG/DL (ref 8.3–10.6)
CHLORIDE BLD-SCNC: 105 MMOL/L (ref 99–110)
CLARITY: CLEAR
CO2: 23 MMOL/L (ref 21–32)
COLOR: YELLOW
CREAT SERPL-MCNC: 1.5 MG/DL (ref 0.6–1.2)
EOSINOPHILS ABSOLUTE: 0.3 K/UL (ref 0–0.6)
EOSINOPHILS RELATIVE PERCENT: 4.4 %
EPITHELIAL CELLS, UA: 0 /HPF (ref 0–5)
GFR AFRICAN AMERICAN: 39
GFR NON-AFRICAN AMERICAN: 33
GLOBULIN: 2.9 G/DL
GLUCOSE BLD-MCNC: 113 MG/DL (ref 70–99)
GLUCOSE BLD-MCNC: 140 MG/DL (ref 70–99)
GLUCOSE URINE: NEGATIVE MG/DL
HCT VFR BLD CALC: 29.2 % (ref 36–48)
HEMOGLOBIN: 9.5 G/DL (ref 12–16)
HYALINE CASTS: 0 /LPF (ref 0–8)
INR BLD: 1.09 (ref 0.86–1.14)
KETONES, URINE: NEGATIVE MG/DL
LEUKOCYTE ESTERASE, URINE: ABNORMAL
LYMPHOCYTES ABSOLUTE: 1.7 K/UL (ref 1–5.1)
LYMPHOCYTES RELATIVE PERCENT: 29.4 %
MCH RBC QN AUTO: 30.5 PG (ref 26–34)
MCHC RBC AUTO-ENTMCNC: 32.5 G/DL (ref 31–36)
MCV RBC AUTO: 93.8 FL (ref 80–100)
MICROSCOPIC EXAMINATION: YES
MONOCYTES ABSOLUTE: 0.7 K/UL (ref 0–1.3)
MONOCYTES RELATIVE PERCENT: 11.6 %
NEUTROPHILS ABSOLUTE: 3.1 K/UL (ref 1.7–7.7)
NEUTROPHILS RELATIVE PERCENT: 53.6 %
NITRITE, URINE: NEGATIVE
PDW BLD-RTO: 13.8 % (ref 12.4–15.4)
PERFORMED ON: ABNORMAL
PH UA: 6.5 (ref 5–8)
PLATELET # BLD: 209 K/UL (ref 135–450)
PMV BLD AUTO: 8.6 FL (ref 5–10.5)
POTASSIUM SERPL-SCNC: 4.8 MMOL/L (ref 3.5–5.1)
PROTEIN UA: NEGATIVE MG/DL
PROTHROMBIN TIME: 12.7 SEC (ref 10–13.2)
RBC # BLD: 3.12 M/UL (ref 4–5.2)
RBC UA: 2 /HPF (ref 0–4)
SODIUM BLD-SCNC: 142 MMOL/L (ref 136–145)
SPECIFIC GRAVITY UA: 1.02 (ref 1–1.03)
TOTAL PROTEIN: 6.6 G/DL (ref 6.4–8.2)
URINE REFLEX TO CULTURE: YES
URINE TYPE: ABNORMAL
UROBILINOGEN, URINE: 0.2 E.U./DL
WBC # BLD: 5.8 K/UL (ref 4–11)
WBC UA: 27 /HPF (ref 0–5)

## 2020-01-07 PROCEDURE — G0378 HOSPITAL OBSERVATION PER HR: HCPCS

## 2020-01-07 PROCEDURE — 80053 COMPREHEN METABOLIC PANEL: CPT

## 2020-01-07 PROCEDURE — 6370000000 HC RX 637 (ALT 250 FOR IP): Performed by: INTERNAL MEDICINE

## 2020-01-07 PROCEDURE — 51701 INSERT BLADDER CATHETER: CPT

## 2020-01-07 PROCEDURE — 6360000004 HC RX CONTRAST MEDICATION: Performed by: EMERGENCY MEDICINE

## 2020-01-07 PROCEDURE — 36415 COLL VENOUS BLD VENIPUNCTURE: CPT

## 2020-01-07 PROCEDURE — 70450 CT HEAD/BRAIN W/O DYE: CPT

## 2020-01-07 PROCEDURE — 6360000002 HC RX W HCPCS: Performed by: EMERGENCY MEDICINE

## 2020-01-07 PROCEDURE — 85025 COMPLETE CBC W/AUTO DIFF WBC: CPT

## 2020-01-07 PROCEDURE — 85610 PROTHROMBIN TIME: CPT

## 2020-01-07 PROCEDURE — 87077 CULTURE AEROBIC IDENTIFY: CPT

## 2020-01-07 PROCEDURE — 81001 URINALYSIS AUTO W/SCOPE: CPT

## 2020-01-07 PROCEDURE — 93005 ELECTROCARDIOGRAM TRACING: CPT | Performed by: EMERGENCY MEDICINE

## 2020-01-07 PROCEDURE — 71045 X-RAY EXAM CHEST 1 VIEW: CPT

## 2020-01-07 PROCEDURE — 99285 EMERGENCY DEPT VISIT HI MDM: CPT

## 2020-01-07 PROCEDURE — 87086 URINE CULTURE/COLONY COUNT: CPT

## 2020-01-07 PROCEDURE — 70496 CT ANGIOGRAPHY HEAD: CPT

## 2020-01-07 PROCEDURE — 96374 THER/PROPH/DIAG INJ IV PUSH: CPT

## 2020-01-07 PROCEDURE — 2580000003 HC RX 258: Performed by: INTERNAL MEDICINE

## 2020-01-07 PROCEDURE — 85730 THROMBOPLASTIN TIME PARTIAL: CPT

## 2020-01-07 PROCEDURE — 87186 SC STD MICRODIL/AGAR DIL: CPT

## 2020-01-07 RX ORDER — QUETIAPINE FUMARATE 25 MG/1
12.5 TABLET, FILM COATED ORAL DAILY
COMMUNITY

## 2020-01-07 RX ORDER — ACETAMINOPHEN 325 MG/1
650 TABLET ORAL EVERY 6 HOURS PRN
COMMUNITY

## 2020-01-07 RX ORDER — MENTHOL AND ZINC OXIDE .44; 20.625 G/100G; G/100G
OINTMENT TOPICAL 2 TIMES DAILY PRN
COMMUNITY

## 2020-01-07 RX ORDER — LACTOBACILLUS RHAMNOSUS GG 10B CELL
1 CAPSULE ORAL DAILY
COMMUNITY

## 2020-01-07 RX ORDER — ONDANSETRON 2 MG/ML
4 INJECTION INTRAMUSCULAR; INTRAVENOUS EVERY 6 HOURS PRN
Status: DISCONTINUED | OUTPATIENT
Start: 2020-01-07 | End: 2020-01-09 | Stop reason: HOSPADM

## 2020-01-07 RX ORDER — LOPERAMIDE HYDROCHLORIDE 2 MG/1
2 CAPSULE ORAL 4 TIMES DAILY PRN
COMMUNITY

## 2020-01-07 RX ORDER — ASPIRIN 81 MG/1
81 TABLET ORAL DAILY
Status: DISCONTINUED | OUTPATIENT
Start: 2020-01-08 | End: 2020-01-09 | Stop reason: HOSPADM

## 2020-01-07 RX ORDER — FLUOXETINE 10 MG/1
10 CAPSULE ORAL DAILY
Status: DISCONTINUED | OUTPATIENT
Start: 2020-01-08 | End: 2020-01-09 | Stop reason: HOSPADM

## 2020-01-07 RX ORDER — ATORVASTATIN CALCIUM 80 MG/1
80 TABLET, FILM COATED ORAL NIGHTLY
Status: DISCONTINUED | OUTPATIENT
Start: 2020-01-07 | End: 2020-01-09 | Stop reason: HOSPADM

## 2020-01-07 RX ORDER — VANCOMYCIN
250 KIT 4 TIMES DAILY
Status: DISCONTINUED | OUTPATIENT
Start: 2020-01-07 | End: 2020-01-07 | Stop reason: SDUPTHER

## 2020-01-07 RX ORDER — ASPIRIN 300 MG/1
300 SUPPOSITORY RECTAL DAILY
Status: DISCONTINUED | OUTPATIENT
Start: 2020-01-08 | End: 2020-01-09 | Stop reason: HOSPADM

## 2020-01-07 RX ORDER — LACTOBACILLUS RHAMNOSUS GG 10B CELL
1 CAPSULE ORAL DAILY
Status: DISCONTINUED | OUTPATIENT
Start: 2020-01-08 | End: 2020-01-09 | Stop reason: HOSPADM

## 2020-01-07 RX ORDER — SODIUM CHLORIDE 0.9 % (FLUSH) 0.9 %
10 SYRINGE (ML) INJECTION EVERY 12 HOURS SCHEDULED
Status: DISCONTINUED | OUTPATIENT
Start: 2020-01-07 | End: 2020-01-09 | Stop reason: HOSPADM

## 2020-01-07 RX ORDER — SODIUM CHLORIDE 0.9 % (FLUSH) 0.9 %
10 SYRINGE (ML) INJECTION PRN
Status: DISCONTINUED | OUTPATIENT
Start: 2020-01-07 | End: 2020-01-09 | Stop reason: HOSPADM

## 2020-01-07 RX ORDER — ACETAMINOPHEN 325 MG/1
650 TABLET ORAL EVERY 6 HOURS PRN
Status: DISCONTINUED | OUTPATIENT
Start: 2020-01-07 | End: 2020-01-09 | Stop reason: HOSPADM

## 2020-01-07 RX ORDER — VANCOMYCIN
250 KIT 4 TIMES DAILY
COMMUNITY
Start: 2020-01-03 | End: 2020-01-13

## 2020-01-07 RX ORDER — QUETIAPINE FUMARATE 25 MG/1
12.5 TABLET, FILM COATED ORAL DAILY
Status: DISCONTINUED | OUTPATIENT
Start: 2020-01-08 | End: 2020-01-09 | Stop reason: HOSPADM

## 2020-01-07 RX ORDER — FLUOXETINE 10 MG/1
10 CAPSULE ORAL DAILY
COMMUNITY

## 2020-01-07 RX ADMIN — VANCOMYCIN HYDROCHLORIDE 250 MG: KIT at 23:43

## 2020-01-07 RX ADMIN — Medication 10 ML: at 23:44

## 2020-01-07 RX ADMIN — IOPAMIDOL 75 ML: 755 INJECTION, SOLUTION INTRAVENOUS at 14:29

## 2020-01-07 RX ADMIN — Medication 1 G: at 18:00

## 2020-01-07 ASSESSMENT — PAIN SCALES - PAIN ASSESSMENT IN ADVANCED DEMENTIA (PAINAD)
FACIALEXPRESSION: 0
BREATHING: 0
NEGVOCALIZATION: 0
NEGVOCALIZATION: 0
BODYLANGUAGE: 0
NEGVOCALIZATION: 0
TOTALSCORE: 0
CONSOLABILITY: 0
BREATHING: 0
BODYLANGUAGE: 0
CONSOLABILITY: 0
TOTALSCORE: 0
FACIALEXPRESSION: 0
BODYLANGUAGE: 0
CONSOLABILITY: 0
TOTALSCORE: 0
FACIALEXPRESSION: 0
BREATHING: 0

## 2020-01-07 NOTE — ED NOTES
Pt family called and message left informing them we need a consent form signed for the MRI.        Ankur Campbell RN  01/07/20 [id number]

## 2020-01-07 NOTE — ED PROVIDER NOTES
for slurring and possible right-sided face or mouth drooping  Hematological: Negative for adenopathy. Psychiatric/Behavioral: Negative for confusion. Except as noted above the remainder of the review of systems was reviewed and negative. PAST MEDICAL HISTORY     Past Medical History:   Diagnosis Date    CKD (chronic kidney disease)     Dementia          SURGICALHISTORY     No past surgical history on file. CURRENT MEDICATIONS       Previous Medications    ACETAMINOPHEN (TYLENOL) 325 MG TABLET    Take 650 mg by mouth every 6 hours as needed for Pain    BISACODYL (DULCOLAX) 5 MG EC TABLET    Take 5 mg by mouth every 12 hours as needed for Constipation    FLUOXETINE (PROZAC) 10 MG CAPSULE    Take 10 mg by mouth daily    LACTOBACILLUS (CULTURELLE) CAPS CAPSULE    Take 1 capsule by mouth daily    LOPERAMIDE (IMODIUM) 2 MG CAPSULE    Take 2 mg by mouth 4 times daily as needed for Diarrhea    MENTHOL-ZINC OXIDE (CALMOSEPTINE) 0.44-20.625 % OINT OINTMENT    Apply topically 2 times daily as needed Max 30 ml per day. NIFEDIPINE (ADALAT CC) 30 MG EXTENDED RELEASE TABLET    Take 1 tablet by mouth daily    QUETIAPINE (SEROQUEL) 25 MG TABLET    Take 12.5 mg by mouth daily    VANCOMYCIN (VANCOCIN) 50 MG/ML SOLN    Take 250 mg by mouth 4 times daily       ALLERGIES     Patient has no known allergies. FAMILY HISTORY     No family history on file.        SOCIAL HISTORY       Social History     Socioeconomic History    Marital status:      Spouse name: Not on file    Number of children: Not on file    Years of education: Not on file    Highest education level: Not on file   Occupational History    Not on file   Social Needs    Financial resource strain: Not on file    Food insecurity:     Worry: Not on file     Inability: Not on file    Transportation needs:     Medical: Not on file     Non-medical: Not on file   Tobacco Use    Smoking status: Not on file   Substance and Sexual Activity [phone]       All other labs were within normal range or not returned as of this dictation. EMERGENCY DEPARTMENT COURSE and DIFFERENTIAL DIAGNOSIS/MDM:   Vitals:    Vitals:    01/07/20 1452 01/07/20 1530 01/07/20 1630 01/07/20 1730   BP: (!) 140/58 (!) 152/91 (!) 146/76 (!) 165/83   Pulse: 73 72 68 71   Resp: 22 15 12 13   Temp: 97.4 °F (36.3 °C)      TempSrc: Axillary      SpO2: 97% 99%  99%           MDM    The patient has remained stable throughout her hospital course. The patient's initial work-up included a CT of the head that shows no acute findings. CTs of the patient's head and neck with angiography show no significant occlusion. The patient's work-up also included a urinalysis that appear to be infected and she will be treated for such. I have been in contact with the  stroke team who agrees with my care and state the patient is not a eligible patient for TPA or other acute intervention at this time. Be admitted for further care. REASSESSMENT          CRITICAL CARE TIME   Total Critical Care time was 40 minutes, excluding separatelyreportable procedures. There was a high probability ofclinically significant/life threatening deterioration in the patient's condition which required my urgent intervention. CONSULTS:  IP CONSULT TO HOSPITALIST    PROCEDURES:  Unless otherwise noted below, none     Procedures    FINAL IMPRESSION      1. Mental status, decreased    2. Acute cystitis without hematuria          DISPOSITION/PLAN   DISPOSITION Decision To Admit 01/07/2020 05:49:02 PM      PATIENT REFERREDTO:  No follow-up provider specified. DISCHARGEMEDICATIONS:  New Prescriptions    No medications on file     Controlled Substances Monitoring:     No flowsheet data found.     (Please note that portions of this note were completed with a voice recognition program.  Efforts were made to edit the dictations but occasionally words are mis-transcribed.)    Louann Busch MD (electronically

## 2020-01-07 NOTE — ED NOTES
Pt family returned, MRI consent form signed and faxed to MRI.        Bradly Felipe RN  01/07/20 3083

## 2020-01-07 NOTE — ED NOTES
Pharmacy Medication History Note      List of current medications patient is taking is complete. Source of information: MAR from [address] made to medication list:  Medications flagged for removal (include reason, ex. noncompliance):  N/A    Medications removed (include reason, ex. therapy complete or physician discontinued):  Seroquel- dose adjustment    Medications added/doses adjusted:  Bisacodyl 5mg- BID  Seroquel 25mg- 1/2 tab QD  Vancomycin 250mg- QID    Other notes (ex. Recent course of antibiotics, Coumadin dosing):  Denies use of other OTC or herbal medications. Last dose times updated. [facility]    No current facility-administered medications on file prior to encounter. Current Outpatient Medications on File Prior to Encounter   Medication Sig Dispense Refill    bisacodyl (DULCOLAX) 5 MG EC tablet Take 5 mg by mouth every 12 hours as needed for Constipation      menthol-zinc oxide (CALMOSEPTINE) 0.44-20.625 % OINT ointment Apply topically 2 times daily as needed Max 30 ml per day.       FLUoxetine (PROZAC) 10 MG capsule Take 10 mg by mouth daily      loperamide (IMODIUM) 2 MG capsule Take 2 mg by mouth 4 times daily as needed for Diarrhea      lactobacillus (CULTURELLE) CAPS capsule Take 1 capsule by mouth daily      QUEtiapine (SEROQUEL) 25 MG tablet Take 12.5 mg by mouth daily      acetaminophen (TYLENOL) 325 MG tablet Take 650 mg by mouth every 6 hours as needed for Pain      vancomycin (VANCOCIN) 50 MG/ML SOLN Take 250 mg by mouth 4 times daily      NIFEdipine (ADALAT CC) 30 MG extended release tablet Take 1 tablet by mouth daily 30 tablet 3    [DISCONTINUED] vancomycin 50 mg/mL in sterile water injection Take 250 mg by mouth 4 times daily      [DISCONTINUED] QUEtiapine (SEROQUEL) 25 MG tablet Take 1 tablet by mouth nightly as needed for Agitation 60 tablet 0    [DISCONTINUED] FLUoxetine (PROZAC) 10 MG capsule Take 1 capsule by mouth daily 30 capsule 0

## 2020-01-08 ENCOUNTER — APPOINTMENT (OUTPATIENT)
Dept: MRI IMAGING | Age: 85
DRG: 689 | End: 2020-01-08
Payer: MEDICARE

## 2020-01-08 LAB
ANION GAP SERPL CALCULATED.3IONS-SCNC: 13 MMOL/L (ref 3–16)
BUN BLDV-MCNC: 31 MG/DL (ref 7–20)
CALCIUM SERPL-MCNC: 8.9 MG/DL (ref 8.3–10.6)
CHLORIDE BLD-SCNC: 107 MMOL/L (ref 99–110)
CHOLESTEROL, TOTAL: 199 MG/DL (ref 0–199)
CO2: 23 MMOL/L (ref 21–32)
CREAT SERPL-MCNC: 1.3 MG/DL (ref 0.6–1.2)
EKG ATRIAL RATE: 73 BPM
EKG DIAGNOSIS: NORMAL
EKG P AXIS: 88 DEGREES
EKG P-R INTERVAL: 146 MS
EKG Q-T INTERVAL: 432 MS
EKG QRS DURATION: 84 MS
EKG QTC CALCULATION (BAZETT): 475 MS
EKG R AXIS: 8 DEGREES
EKG T AXIS: 3 DEGREES
EKG VENTRICULAR RATE: 73 BPM
GFR AFRICAN AMERICAN: 47
GFR NON-AFRICAN AMERICAN: 39
GLUCOSE BLD-MCNC: 91 MG/DL (ref 70–99)
HCT VFR BLD CALC: 28.7 % (ref 36–48)
HDLC SERPL-MCNC: 48 MG/DL (ref 40–60)
HEMOGLOBIN: 9.5 G/DL (ref 12–16)
LDL CHOLESTEROL CALCULATED: 134 MG/DL
LV EF: 58 %
LVEF MODALITY: NORMAL
MCH RBC QN AUTO: 30.7 PG (ref 26–34)
MCHC RBC AUTO-ENTMCNC: 33 G/DL (ref 31–36)
MCV RBC AUTO: 93.1 FL (ref 80–100)
PDW BLD-RTO: 13.7 % (ref 12.4–15.4)
PLATELET # BLD: 205 K/UL (ref 135–450)
PMV BLD AUTO: 8.1 FL (ref 5–10.5)
POTASSIUM SERPL-SCNC: 4.4 MMOL/L (ref 3.5–5.1)
RBC # BLD: 3.08 M/UL (ref 4–5.2)
SODIUM BLD-SCNC: 143 MMOL/L (ref 136–145)
TRIGL SERPL-MCNC: 85 MG/DL (ref 0–150)
VLDLC SERPL CALC-MCNC: 17 MG/DL
WBC # BLD: 4.9 K/UL (ref 4–11)

## 2020-01-08 PROCEDURE — G0378 HOSPITAL OBSERVATION PER HR: HCPCS

## 2020-01-08 PROCEDURE — 80048 BASIC METABOLIC PNL TOTAL CA: CPT

## 2020-01-08 PROCEDURE — 96376 TX/PRO/DX INJ SAME DRUG ADON: CPT

## 2020-01-08 PROCEDURE — 94760 N-INVAS EAR/PLS OXIMETRY 1: CPT

## 2020-01-08 PROCEDURE — 2580000003 HC RX 258: Performed by: INTERNAL MEDICINE

## 2020-01-08 PROCEDURE — 96375 TX/PRO/DX INJ NEW DRUG ADDON: CPT

## 2020-01-08 PROCEDURE — 6360000002 HC RX W HCPCS: Performed by: INTERNAL MEDICINE

## 2020-01-08 PROCEDURE — 85027 COMPLETE CBC AUTOMATED: CPT

## 2020-01-08 PROCEDURE — 93306 TTE W/DOPPLER COMPLETE: CPT

## 2020-01-08 PROCEDURE — 83036 HEMOGLOBIN GLYCOSYLATED A1C: CPT

## 2020-01-08 PROCEDURE — 2700000000 HC OXYGEN THERAPY PER DAY

## 2020-01-08 PROCEDURE — 97165 OT EVAL LOW COMPLEX 30 MIN: CPT

## 2020-01-08 PROCEDURE — 97530 THERAPEUTIC ACTIVITIES: CPT

## 2020-01-08 PROCEDURE — 93010 ELECTROCARDIOGRAM REPORT: CPT | Performed by: INTERNAL MEDICINE

## 2020-01-08 PROCEDURE — 92610 EVALUATE SWALLOWING FUNCTION: CPT

## 2020-01-08 PROCEDURE — 97162 PT EVAL MOD COMPLEX 30 MIN: CPT

## 2020-01-08 PROCEDURE — 70551 MRI BRAIN STEM W/O DYE: CPT

## 2020-01-08 PROCEDURE — 6370000000 HC RX 637 (ALT 250 FOR IP): Performed by: INTERNAL MEDICINE

## 2020-01-08 PROCEDURE — 99223 1ST HOSP IP/OBS HIGH 75: CPT | Performed by: PSYCHIATRY & NEUROLOGY

## 2020-01-08 PROCEDURE — 96372 THER/PROPH/DIAG INJ SC/IM: CPT

## 2020-01-08 PROCEDURE — 36415 COLL VENOUS BLD VENIPUNCTURE: CPT

## 2020-01-08 PROCEDURE — 80061 LIPID PANEL: CPT

## 2020-01-08 PROCEDURE — 92526 ORAL FUNCTION THERAPY: CPT

## 2020-01-08 RX ORDER — NIFEDIPINE 30 MG/1
30 TABLET, EXTENDED RELEASE ORAL DAILY
Status: DISCONTINUED | OUTPATIENT
Start: 2020-01-08 | End: 2020-01-09 | Stop reason: HOSPADM

## 2020-01-08 RX ORDER — HYDRALAZINE HYDROCHLORIDE 20 MG/ML
10 INJECTION INTRAMUSCULAR; INTRAVENOUS EVERY 6 HOURS PRN
Status: DISCONTINUED | OUTPATIENT
Start: 2020-01-08 | End: 2020-01-09 | Stop reason: HOSPADM

## 2020-01-08 RX ADMIN — Medication 1 G: at 17:03

## 2020-01-08 RX ADMIN — ASPIRIN 81 MG: 81 TABLET, COATED ORAL at 11:45

## 2020-01-08 RX ADMIN — Medication 1 CAPSULE: at 11:45

## 2020-01-08 RX ADMIN — FLUOXETINE 10 MG: 10 CAPSULE ORAL at 11:45

## 2020-01-08 RX ADMIN — ATORVASTATIN CALCIUM 80 MG: 80 TABLET, FILM COATED ORAL at 19:51

## 2020-01-08 RX ADMIN — ENOXAPARIN SODIUM 30 MG: 30 INJECTION SUBCUTANEOUS at 11:46

## 2020-01-08 RX ADMIN — VANCOMYCIN HYDROCHLORIDE 250 MG: KIT at 12:36

## 2020-01-08 RX ADMIN — QUETIAPINE FUMARATE 12.5 MG: 25 TABLET ORAL at 11:46

## 2020-01-08 RX ADMIN — Medication 10 ML: at 19:51

## 2020-01-08 RX ADMIN — NIFEDIPINE 30 MG: 30 TABLET, EXTENDED RELEASE ORAL at 11:56

## 2020-01-08 RX ADMIN — VANCOMYCIN HYDROCHLORIDE 250 MG: KIT at 06:11

## 2020-01-08 RX ADMIN — HYDRALAZINE HYDROCHLORIDE 10 MG: 20 INJECTION INTRAMUSCULAR; INTRAVENOUS at 17:03

## 2020-01-08 RX ADMIN — VANCOMYCIN HYDROCHLORIDE 250 MG: KIT at 18:24

## 2020-01-08 RX ADMIN — Medication 10 ML: at 11:58

## 2020-01-08 ASSESSMENT — PAIN SCALES - PAIN ASSESSMENT IN ADVANCED DEMENTIA (PAINAD)
BODYLANGUAGE: 0
FACIALEXPRESSION: 0
NEGVOCALIZATION: 0
CONSOLABILITY: 0
BODYLANGUAGE: 0
NEGVOCALIZATION: 0
FACIALEXPRESSION: 0
BREATHING: 0
CONSOLABILITY: 0
FACIALEXPRESSION: 0
TOTALSCORE: 0
FACIALEXPRESSION: 0
FACIALEXPRESSION: 0
BREATHING: 0
NEGVOCALIZATION: 0
NEGVOCALIZATION: 0
CONSOLABILITY: 0
BODYLANGUAGE: 0
NEGVOCALIZATION: 0
BREATHING: 0
BODYLANGUAGE: 0
CONSOLABILITY: 0
BODYLANGUAGE: 0
BODYLANGUAGE: 0
BREATHING: 0
CONSOLABILITY: 0
BREATHING: 0
CONSOLABILITY: 0
FACIALEXPRESSION: 0
TOTALSCORE: 0
BODYLANGUAGE: 0
CONSOLABILITY: 0
BREATHING: 0
TOTALSCORE: 0
TOTALSCORE: 0
BREATHING: 0
NEGVOCALIZATION: 0
NEGVOCALIZATION: 0
TOTALSCORE: 0
FACIALEXPRESSION: 0

## 2020-01-08 NOTE — PROGRESS NOTES
CLINICAL BEDSIDE SWALLOWING EVALUATION  Speech Therapy Department    Patient Name:  Hetal Deshpande  :  1930  Pain level: Pt did not report pain  Medical Diagnosis:   Acute CVA (cerebrovascular accident) ([facility] [address] [I63.9]  Acute CVA (cerebrovascular accident) ([facility] [address] [I63.9]     HPI: Per chart: Hetal Deshpande is a 80 y.o. female is not a good historian secondary to dementia and speech abnormality most of this is obtained from ER physician. The ER physician  Kristin Galan is a 80 y.o. female who presents to the emergency department for status changes.  This is an 80-year-old female with history of dementia who presents from [facility] with increased confusion.  Apparently they thought that the patient's right side of her mouth was drooping and her words were more slurred.  It is not clear when this started but they think it started this morning around 8:00 AM or so.  We do not know the patient's last normal was.  The patient has a history of dementia and is difficult to get a good history. CT of the head was negative for acute disease CTA of the head and neck was also negative for acute disease received aspirin  C. difficile colitis currently does not have diarrhea on p.o. vancomycin    MRI of Brain:  1. No acute intracranial abnormality.  Specifically, no acute infarction. 2. Mild diffuse brain parenchymal volume loss.  Sequela of severe chronic   microvascular ischemic changes. 3. Fluid in the right mastoid air cells. Chest x-ray:  Findings suggest congestive heart failure    Treatment Diagnosis:  Moderate Oropharyngeal Dysphagia    Impressions: Pt was seen sitting upright in bed, she speaks [place] and communication was difficult as Pt has dementia. Per chart, Pt is on a mechanical soft diet with thin liquids at [facility].   Unable to assess oral motor function as Pt did not follow/comprehend commands, however labial weakness was noted with questionable facial asymmetry. Various textures were provided to assess swallowing function. Pt presents with moderate oropharyngeal dysphagia characterized by prolonged/reduced mastication, reduced bolus control, suspected premature bolus loss to pharynx, delayed swallow initiation, reduced laryngeal elevation upon manual palpation, intermittent clinical s/s of reduced pharyngeal clearing, and one instance of delayed coughing post thin liquid trials. Thin liquids via cup revealed suspected premature bolus loss to pharynx, delayed swallow initiation was noted with one instance of immediate coughing post swallow. Intermittent use of 2-3 swallows was noted post thin liquid trials. Prolonged mastication/gumming was noted with regular solid trials, eventual oral clearance was achieved with use of liquid wash. Overall, Pt is at risk for aspiration based on delayed swallow initiation, suspected reduced airway protection, and suspected reduced pharyngeal clearing. Recommend strict use of aspiration precautions. Dietary Recommendations: Dysphagia II Minced and Moist with thin liquids, no straws, meds crushed in puree    Strategies: 90 degree positioning with all p.o. intake; small bites/sips; alternate textures through meal; reduce rate of intake    Treatment/Goals: Speech therapy for dysphagia tx 3-5 times per week. ST.) Pt will tolerate recommended diet without s/s of aspiration   2.) If clinical symptoms of penetration/aspiration continue to be noted,Pt will tolerate MBS to r/o aspiration and determine appropriate diet/liquid level.    3.) Pt will tolerate diet advance to least restricted diet, as clinically indicated, with no signs of aspiration     Oral motor Exam:  Dentition: edentulous   Labial/Facial: unable to fully assess, question facial asymmetry   Lingual: unable to fully assess  Voice: within functional limits     Oral Phase:   Reduced/prolonged mastication  Reduced A-P propulsion  Apparent premature bolus loss to pharynx    Pharyngeal Phase:  Apparent pharyngeal pooling  Delayed swallow initiation  Decreased laryngeal elevation via palpation   Apparent decreased pharyngeal clearing  Coughing (immediate) with thin liquids x1    Patient/Family Education: Education, results and recommendations given to the Pt (no evidence of learning) and nurse, who verbalized understanding    Timed Code Treatment: 0 minutes    Total Treatment Time: 23 minutes    Discharge Recommendations: Speech Therapy for Speech/Dysphagia treatment at discharge.     Prashant Morin M.A., [address] [place] [address]  Speech-Language Pathologist

## 2020-01-08 NOTE — ED NOTES
Report called to Carissa Quiñonez, nurse resuming care of patient. No questions or concerns at this time. To be placed on telemetry monitor when it arrives. Pt remains in bed at this time. VSS, call light within reach, bed in lowest position with wheels locked.       Antonio Holliday RN  70/55/02 2138

## 2020-01-08 NOTE — PROGRESS NOTES
Occupational Therapy   Occupational Therapy Initial Assessment  Date: 2020   Patient Name: Hetal Deshpande  MRN: [id number]     : 1930    Date of Service: 2020    Discharge Recommendations:  Hetal Deshpande scored a 9/24 on the AM-PAC ADL Inpatient form. Current research shows that an AM-PAC score of 17 or less is typically not associated with a discharge to the patient's home setting. Based on the patients AM-PAC score and their current ADL deficits, it is recommended that the patient have 3-5 sessions per week of Occupational Therapy at d/c to increase the patients independence. OT Equipment Recommendations  Equipment Needed: No  Other: continue to assess needs    Assessment   Performance deficits / Impairments: Decreased functional mobility ; Decreased ADL status; Decreased strength;Decreased balance  Assessment: pt presents with the above stated performance deficits secondary to possible CVA, MRI negative   Treatment Diagnosis: pt presents with the above stated performance deficits secondary to workup for CVA  Prognosis: Fair  Decision Making: Low Complexity  History: per chart, pt living with family, assist for ADL/IADL tasks, pt able to feed self normally. uses rw  Assistance / Modification: assist of 2  Patient Education: eval, POC, discharge  Barriers to Learning: language  REQUIRES OT FOLLOW UP: Yes  Activity Tolerance  Activity Tolerance: limited by lethargy and language barrier, attempted to call pt's family for PLOF, did not answer this date  Safety Devices  Safety Devices in place: Yes  Type of devices: All fall risk precautions in place; Patient at risk for falls;Call light within reach; Bed alarm in place; Left in bed;Nurse notified(transport to ECHO )  Restraints  Initially in place: No           Patient Diagnosis(es): The primary encounter diagnosis was Mental status, decreased. A diagnosis of Acute cystitis without hematuria was also pertinent to this visit. has a past medical history of CKD (chronic kidney disease) and Dementia ([facility] [address] has no past surgical history on file. Treatment Diagnosis: pt presents with the above stated performance deficits secondary to workup for CVA      Restrictions  Restrictions/Precautions  Restrictions/Precautions: Fall Risk, Weight Bearing(high fall risk)  Lower Extremity Weight Bearing Restrictions  Right Lower Extremity Weight Bearing: Weight Bearing As Tolerated  Partial Weight Bearing Percentage Or Pounds: s/p: R hemiarthroplasty x 6 weeks ago, WBAT per chart  Position Activity Restriction  Other position/activity restrictions: Tameka Lopez is a 80 y.o. female who presents to the emergency department for status changes. This is an 72-year-old female with history of dementia who presents from [facility] with increased confusion. Apparently they thought that the patient's right side of her mouth was drooping and her words were more slurred. It is not clear when this started but they think it started this morning around 8:00 AM or so. We do not know the patient's last normal was. The patient has a history of dementia and is difficult to get a good history. Subjective   General  Chart Reviewed: Yes  Patient assessed for rehabilitation services?: Yes  Additional Pertinent Hx: dementia, previous CVA  Family / Caregiver Present: No  Diagnosis: rule out CVA-MRI negative   Subjective  Subjective: pt supine upon arrival and agreeable to OT eval, denies pain.  lethargic throughout session   Patient Currently in Pain: Denies  Vital Signs  Patient Currently in Pain: Denies  Social/Functional History  Social/Functional History  Lives With: Spouse(lives with spouse who had dementia )  Type of Home: House  Home Layout: Two level, Performs ADL's on one level, Able to Live on Main level with bedroom/bathroom, Bed/Bath upstairs  Home Access: Stairs to enter with rails  Entrance Stairs - Number of Steps: 2 with ascending HR +1  Bathroom Shower/Tub: Tub only(clawfoot tub, assist to get into tub, pt gets all the way in the tub )  Bathroom Toilet: Standard(has toilet safety frame, spouse takes it out )  [address]: Hand-held shower, Grab bars in shower  Bathroom Accessibility: Walker accessible  Home Equipment: Rolling walker  ADL Assistance: Needs assistance(niece assists, pt able to feed self at baseline)  [address]: Needs assistance(IADL tasks done by family)  Ambulation Assistance: Independent(pt reports she uses RW)  Transfer Assistance: Independent  Active : No  Occupation: Retired  Additional Comments: pt had R hemiarthroplasty x 6 weeks ago. WBAT per [facility] chart [facility]. social functional hx obtained from prior chart from 9/26/19. attempted to call family this date, did not answer       Objective        Orientation  Overall Orientation Status: Impaired  Orientation Level: Disoriented X4  Observation/Palpation  Posture: Fair(Simultaneous filing. User may not have seen previous data.)  Observation: facial droop noted. lethargic throughout session, R lateral lean (Simultaneous filing. User may not have seen previous data.)  Balance  Sitting Balance: Minimal assistance(Narciso to CGA)  Standing Balance: Dependent/Total(Narciso of 2 to maxA of 2)  Standing Balance  Time: ~10 seconds x4 stands for ashlyn care, transfer EOB>BSC>EOB  Comment: Narciso of 2 for transfer to [facility], maxA of 2 back from [facility], pt becoming more lethargic throughout session   Functional Mobility  Functional Mobility Comments: pt reports she uses a rw, no rw used this date secondary to decreased level of alertness  Toilet Transfers  Toilet - Technique:  To left;Stand pivot  Equipment Used: Standard bedside commode  Toilet Transfer: 2 Person assistance  Toilet Transfers Comments: Narciso of 2 progressing to maxA of 2  ADL  Feeding: (whole tray still present, pt had not eaten)  Toileting: Dependent/Total(incontinent of urine in a

## 2020-01-08 NOTE — CONSULTS
No carotid bruit bilaterally        DATA:  LABS:  General Labs:    CBC:   Lab Results   Component Value Date    WBC 4.9 01/08/2020    RBC 3.08 01/08/2020    HGB 9.5 01/08/2020    HCT 28.7 01/08/2020    MCV 93.1 01/08/2020    MCH 30.7 01/08/2020    MCHC 33.0 01/08/2020    RDW 13.7 01/08/2020     01/08/2020    MPV 8.1 01/08/2020     BMP:    Lab Results   Component Value Date     01/08/2020    K 4.4 01/08/2020    K 4.2 09/29/2019     01/08/2020    CO2 23 01/08/2020    BUN 31 01/08/2020    LABALBU 3.7 01/07/2020    CREATININE 1.3 01/08/2020    CALCIUM 8.9 01/08/2020    GFRAA 47 01/08/2020    LABGLOM 39 01/08/2020    GLUCOSE 91 01/08/2020     RADIOLOGY REVIEW:  I have reviewed radiology image(s) and reports(s) of: MRI brain    IMPRESSION :  Acute encephalopathy in a patient with known dementia probably due to urinary tract infection  MRI brain did not show any acute ischemia or stroke  CT angiogram did not show any significant vascular occlusive disease  MRI brain showed extensive chronic white matter changes which 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 Patient Active Problem List   Diagnosis    Uncontrolled hypertension    Hypertensive urgency    Acute CVA (cerebrovascular accident) ([facility] [address]     RECOMMENDATIONS ; Continue antibiotics for your urinary tract infection  Control hypertension  If encephalopathy persists, I will consider an EEG. Agree with aspirin for antiplatelet therapy  Thank you for this consultation        Please note a portion of this chart was generated using dragon dictation software. Although every effort was made to ensure the accuracy of this automated transcription, some errors in transcription may have occurred.

## 2020-01-08 NOTE — PROGRESS NOTES
Patient changed and repositioned. Awaiting speech therapy eval before administration of morning medications. Will continue to monitor.

## 2020-01-08 NOTE — PROGRESS NOTES
Pt alert to self . Pt speaks [place]. Family will be in the am. Pt can only say yes and no but is very confused with delayed responses.

## 2020-01-08 NOTE — PROGRESS NOTES
Morning assessment completed. Pt is resting in bed eyes closed. Pt alert and oriented, call light and belongings within reach, bed in lowest position with wheels locked and alarm on, nonskid footwear on. Instructed to call out for assistance.

## 2020-01-08 NOTE — PROGRESS NOTES
Hospitalist Progress Note      PCP: No primary care provider on file. Date of Admission: 1/7/2020    Chief Complaint: Lethargy. Hospital Course: Patient was brought in for lethargy and was noted to have right sided facial droop    Subjective: Patient could not provide much history secondary to language barrier as well as some dementia. Discussed with family member over the phone      Medications:  Reviewed    Infusion Medications   Scheduled Medications    NIFEdipine  30 mg Oral Daily    sodium chloride flush  10 mL Intravenous 2 times per day    aspirin  81 mg Oral Daily    Or    aspirin  300 mg Rectal Daily    atorvastatin  80 mg Oral Nightly    FLUoxetine  10 mg Oral Daily    lactobacillus  1 capsule Oral Daily    QUEtiapine  12.5 mg Oral Daily    cefTRIAXone (ROCEPHIN) IV  1 g Intravenous Q24H    enoxaparin  30 mg Subcutaneous Daily    vancomycin  250 mg Oral 4 times per day     PRN Meds: hydrALAZINE, sodium chloride flush, magnesium hydroxide, ondansetron, acetaminophen    No intake or output data in the 24 hours ending 01/08/20 1401    Physical Exam Performed:    BP (!) 203/67 Comment: procardia given  Pulse 63   Temp 98 °F (36.7 °C) (Axillary)   Resp 16   Ht 5' 2\" (1.575 m)   Wt 101 lb 12.8 oz (46.2 kg)   SpO2 100%   BMI 18.62 kg/m²     General appearance: No apparent distress, appears stated age and cooperative. HEENT: Pupils equal, round, and reactive to light. Conjunctivae/corneas clear. Right sided facial deviation noted. Neck: Supple, with full range of motion. No jugular venous distention. Trachea midline. Respiratory:  Normal respiratory effort. Clear to auscultation, bilaterally without Rales/Wheezes/Rhonchi. Cardiovascular: Regular rate and rhythm with normal S1/S2 without murmurs, rubs or gallops. Abdomen: Soft, non-tender, non-distended with normal bowel sounds. Musculoskeletal: No clubbing, cyanosis or edema bilaterally.   Full range of motion without deformity. Skin: Skin color, texture, turgor normal.  No rashes or lesions. Neurologic:  Neurovascularly intact without any focal sensory/motor deficits. Right facial droop. Power and sensations are bilaterally symmetrical.  Psychiatric: Alert and oriented, thought content appropriate, normal insight  Capillary Refill: Brisk,< 3 seconds   Peripheral Pulses: +2 palpable, equal bilaterally       Labs:   Recent Labs     01/07/20  1425 01/08/20  0542   WBC 5.8 4.9   HGB 9.5* 9.5*   HCT 29.2* 28.7*    205     Recent Labs     01/07/20  1425 01/08/20  0541    143   K 4.8 4.4    107   CO2 23 23   BUN 37* 31*   CREATININE 1.5* 1.3*   CALCIUM 8.9 8.9     Recent Labs     01/07/20  1425   AST 13*   ALT 9*   BILITOT <0.2   ALKPHOS 118     Recent Labs     01/07/20  1425   INR 1.09     No results for input(s): CKTOTAL, TROPONINI in the last 72 hours. Urinalysis:      Lab Results   Component Value Date    NITRU Negative 01/07/2020    WBCUA 27 01/07/2020    BACTERIA 4+ 01/07/2020    RBCUA 2 01/07/2020    BLOODU TRACE 01/07/2020    SPECGRAV 1.019 01/07/2020    GLUCOSEU Negative 01/07/2020       Radiology:  MRI brain without contrast   Final Result   1. No acute intracranial abnormality. Specifically, no acute infarction. 2. Mild diffuse brain parenchymal volume loss. Sequela of severe chronic   microvascular ischemic changes. 3. Fluid in the right mastoid air cells. XR CHEST PORTABLE   Final Result   Findings suggest congestive heart failure         CTA HEAD NECK W CONTRAST   Final Result   1. No intracranial proximal large artery focal high-grade stenosis or   occlusion. Atherosclerotic irregularity throughout the proximal large   intracranial arteries. 2.  No hemodynamically significant stenosis in the bilateral cervical   internal carotid arteries per NASCET criteria. Bilateral carotid bulb   atherosclerotic plaque. 3.  Patent bilateral vertebral arteries.       Critical results

## 2020-01-08 NOTE — PROGRESS NOTES
Physical Therapy    Facility/Department: [facility] 5C  Initial Assessment    NAME: Tameka Lopez  : 1930  MRN: [id number]    Date of Service: 2020    Discharge Recommendations:  Tameka Lopez scored a 6/24 on the AM-PAC short mobility form. Current research shows that an AM-PAC score of 17 or less is typically not associated with a discharge to the patient's home setting. Based on the patients AM-PAC score and their current functional mobility deficits, it is recommended that the patient have 3-5 sessions per week of Physical Therapy at d/c to increase the patients independence. PT Equipment Recommendations  Equipment Needed: No    Assessment   Body structures, Functions, Activity limitations: Decreased functional mobility ; Decreased ADL status; Decreased cognition;Decreased balance;Decreased endurance;Decreased posture  Assessment: Pt presenting with the above deficits and would benefit from skilled PT services to promote return to PLOF. Family not present or able to be contacted during evaluation and language barrier limited conversation. Per chart recent hip sx 6 wks ago. Unsure of functional level since that procedure. Today pt was dependent with bed mobility and transfers. Treatment Diagnosis: decreased functional mobility   Prognosis: Good  Decision Making: Medium Complexity  PT Education: PT Role;Plan of Care;Goals;Transfer Training;Orientation  Barriers to Learning: language  REQUIRES PT FOLLOW UP: Yes  Activity Tolerance  Activity Tolerance: Patient limited by fatigue;Patient limited by endurance; Patient limited by cognitive status       Patient Diagnosis(es): The primary encounter diagnosis was Mental status, decreased. A diagnosis of Acute cystitis without hematuria was also pertinent to this visit. has a past medical history of CKD (chronic kidney disease) and Dementia ([facility] [address]    has no past surgical history on file.    Restrictions  Restrictions/Precautions  Restrictions/Precautions: Fall Risk, Weight Bearing(high fall risk)  Lower Extremity Weight Bearing Restrictions  Right Lower Extremity Weight Bearing: Weight Bearing As Tolerated  Partial Weight Bearing Percentage Or Pounds: s/p: R hemiarthroplasty x 6 weeks ago, WBAT per chart  Position Activity Restriction  Other position/activity restrictions: Blair Brambila is a 80 y.o. female who presents to the emergency department for status changes. This is an 80-year-old female with history of dementia who presents from [facility] care facility with increased confusion. Apparently they thought that the patient's right side of her mouth was drooping and her words were more slurred. It is not clear when this started but they think it started this morning around 8:00 AM or so. We do not know the patient's last normal was. The patient has a history of dementia and is difficult to get a good history.   Vision/Hearing        Subjective  General  Chart Reviewed: Yes  Patient assessed for rehabilitation services?: Yes  Family / Caregiver Present: No  Diagnosis: CVA (MRI was negative)   General Comment  Comments: supine in bed at arrival   Pain Screening  Patient Currently in Pain: Denies          Orientation  Orientation  Overall Orientation Status: (language barrier and no language line present)  Social/Functional History  Social/Functional History  Lives With: Spouse(lives with spouse who had dementia )  Type of Home: House  Home Layout: Two level, Performs ADL's on one level, Able to Live on Main level with bedroom/bathroom, Bed/Bath upstairs  Home Access: Stairs to enter with rails  Entrance Stairs - Number of Steps: 2 with ascending HR +1  Bathroom Shower/Tub: Tub only(clawfoot tub, assist to get into tub, pt gets all the way in the tub )  Bathroom Toilet: Standard(has toilet safety frame, spouse takes it out )  [employer]: Hand-held shower, Grab bars in

## 2020-01-08 NOTE — H&P
HOSPITALISTS HISTORY AND PHYSICAL    1/7/2020 7:42 PM    Patient Information:  Adeline Goetz is a 80 y.o. female [id number]  PCP:  No primary care provider on file. (Tel: None )    Chief complaint:    Chief Complaint   Patient presents with    Cerebrovascular Accident     pt arrived via sitting in the chair with right sided drooping, grin equal, lethargic-normal alert, s/p 6 weeks right hip replacement-a lot of energy and getting out of bed and chair, usual talk and feed self, hx dementia, change in mental status change, positive for c-diff-on second round of vanco- all info per nursing home [facility] [phone], pt speaks [place] with some English language        History of Present Illness:  Nivia Stubbs is a 80 y.o. female is not a good historian secondary to dementia and speech abnormality most of this is obtained from Alberto Muhammad 35. The ER physician    Nivia Stubbs is a 80 y.o. female who presents to the emergency department for status changes. This is an 66-year-old female with history of dementia who presents from [facility] facility with increased confusion. Apparently they thought that the patient's right side of her mouth was drooping and her words were more slurred. It is not clear when this started but they think it started this morning around 8:00 AM or so. We do not know the patient's last normal was. The patient has a history of dementia and is difficult to get a good history.     CT of the head was negative for acute disease CTA of the head and neck was also negative for acute disease received aspirin    C. difficile colitis currently does not have diarrhea on p.o. vancomycin       REVIEW OF SYSTEMS:   Cannot be obtained as patient cannot give adequate history    Past Medical History:   has a past medical history of CKD (chronic kidney disease) Component Value Date    WBC 5.8 01/07/2020    RBC 3.12 01/07/2020    HGB 9.5 01/07/2020    HCT 29.2 01/07/2020    MCV 93.8 01/07/2020    MCH 30.5 01/07/2020    MCHC 32.5 01/07/2020    RDW 13.8 01/07/2020     01/07/2020    MPV 8.6 01/07/2020     BMP:    Lab Results   Component Value Date     01/07/2020    K 4.8 01/07/2020    K 4.2 09/29/2019     01/07/2020    CO2 23 01/07/2020    BUN 37 01/07/2020    CREATININE 1.5 01/07/2020    CALCIUM 8.9 01/07/2020    GFRAA 39 01/07/2020    LABGLOM 33 01/07/2020    GLUCOSE 113 01/07/2020     XR CHEST PORTABLE   Final Result   Findings suggest congestive heart failure         CTA HEAD NECK W CONTRAST   Final Result   1. No intracranial proximal large artery focal high-grade stenosis or   occlusion. Atherosclerotic irregularity throughout the proximal large   intracranial arteries. 2.  No hemodynamically significant stenosis in the bilateral cervical   internal carotid arteries per NASCET criteria. Bilateral carotid bulb   atherosclerotic plaque. 3.  Patent bilateral vertebral arteries. Critical results were called by Dr. Demetrice Akbar Sessions to DR Padron [facility] on 1/7/2020 at 15:08. CT Head WO Contrast   Final Result   1. No acute intracranial abnormality. 2. Stable age-appropriate atrophy and chronic small vessel ischemic white   matter disease. 3. Chronic lacunar infarct within the left thalamus. 4. Chronic right mastoid disease. Findings were discussed with Dr. Eduardo Tyler at 2:43 pm on 1/7/2020. EKG: Normal Sinus rhythm      Problem List  Active Problems:    Acute CVA (cerebrovascular accident) [facility])  Resolved Problems:    * No resolved hospital problems. *        Assessment/Plan:   CVA.   CT of the head negative for hemorrhage CTA of the head was negative acute disease stroke pathway activated with aspirin MRI of the brain echocardiogram    Hold nifedipine permissive hypertension    C. difficile colitis on p.o. vancomycin.   Put on isolation    Dementia          DVT prophylaxis Lovenox  Code status DNR CC  Diet CARDIAC   I  Daisha Villanueva MD    1/7/2020 7:42 PM

## 2020-01-09 VITALS
BODY MASS INDEX: 18.74 KG/M2 | DIASTOLIC BLOOD PRESSURE: 73 MMHG | OXYGEN SATURATION: 96 % | WEIGHT: 101.8 LBS | TEMPERATURE: 98.4 F | SYSTOLIC BLOOD PRESSURE: 164 MMHG | HEART RATE: 55 BPM | RESPIRATION RATE: 16 BRPM | HEIGHT: 62 IN

## 2020-01-09 PROBLEM — G93.40 ENCEPHALOPATHY: Status: ACTIVE | Noted: 2020-01-09

## 2020-01-09 LAB
ANION GAP SERPL CALCULATED.3IONS-SCNC: 15 MMOL/L (ref 3–16)
BUN BLDV-MCNC: 32 MG/DL (ref 7–20)
CALCIUM SERPL-MCNC: 8.8 MG/DL (ref 8.3–10.6)
CHLORIDE BLD-SCNC: 103 MMOL/L (ref 99–110)
CO2: 22 MMOL/L (ref 21–32)
CREAT SERPL-MCNC: 1.4 MG/DL (ref 0.6–1.2)
ESTIMATED AVERAGE GLUCOSE: 111.2 MG/DL
GFR AFRICAN AMERICAN: 43
GFR NON-AFRICAN AMERICAN: 35
GLUCOSE BLD-MCNC: 108 MG/DL (ref 70–99)
HBA1C MFR BLD: 5.5 %
ORGANISM: ABNORMAL
POTASSIUM SERPL-SCNC: 4.4 MMOL/L (ref 3.5–5.1)
SODIUM BLD-SCNC: 140 MMOL/L (ref 136–145)
URINE CULTURE, ROUTINE: ABNORMAL

## 2020-01-09 PROCEDURE — 80048 BASIC METABOLIC PNL TOTAL CA: CPT

## 2020-01-09 PROCEDURE — 96376 TX/PRO/DX INJ SAME DRUG ADON: CPT

## 2020-01-09 PROCEDURE — 6360000002 HC RX W HCPCS: Performed by: INTERNAL MEDICINE

## 2020-01-09 PROCEDURE — 6370000000 HC RX 637 (ALT 250 FOR IP): Performed by: INTERNAL MEDICINE

## 2020-01-09 PROCEDURE — 96372 THER/PROPH/DIAG INJ SC/IM: CPT

## 2020-01-09 PROCEDURE — 36415 COLL VENOUS BLD VENIPUNCTURE: CPT

## 2020-01-09 PROCEDURE — 1200000000 HC SEMI PRIVATE

## 2020-01-09 PROCEDURE — 2580000003 HC RX 258: Performed by: INTERNAL MEDICINE

## 2020-01-09 RX ORDER — CIPROFLOXACIN 250 MG/1
250 TABLET, FILM COATED ORAL EVERY 12 HOURS SCHEDULED
Qty: 6 TABLET | Refills: 0 | Status: SHIPPED | OUTPATIENT
Start: 2020-01-09 | End: 2020-01-12

## 2020-01-09 RX ORDER — CIPROFLOXACIN 500 MG/1
250 TABLET, FILM COATED ORAL EVERY 12 HOURS SCHEDULED
Status: DISCONTINUED | OUTPATIENT
Start: 2020-01-09 | End: 2020-01-09 | Stop reason: HOSPADM

## 2020-01-09 RX ORDER — METOPROLOL SUCCINATE 25 MG/1
25 TABLET, EXTENDED RELEASE ORAL DAILY
Qty: 30 TABLET | Refills: 3 | Status: SHIPPED | OUTPATIENT
Start: 2020-01-09

## 2020-01-09 RX ORDER — ATORVASTATIN CALCIUM 20 MG/1
20 TABLET, FILM COATED ORAL DAILY
Qty: 30 TABLET | Refills: 2 | Status: SHIPPED | OUTPATIENT
Start: 2020-01-09

## 2020-01-09 RX ORDER — ASPIRIN 81 MG/1
81 TABLET ORAL DAILY
Qty: 30 TABLET | Refills: 3 | Status: SHIPPED | OUTPATIENT
Start: 2020-01-10

## 2020-01-09 RX ORDER — GREEN TEA/HOODIA GORDONII 315-12.5MG
1 CAPSULE ORAL 2 TIMES DAILY
Qty: 60 TABLET | Refills: 0 | Status: SHIPPED | OUTPATIENT
Start: 2020-01-09 | End: 2020-02-08

## 2020-01-09 RX ORDER — METOPROLOL SUCCINATE 25 MG/1
25 TABLET, EXTENDED RELEASE ORAL DAILY
Status: DISCONTINUED | OUTPATIENT
Start: 2020-01-09 | End: 2020-01-09 | Stop reason: HOSPADM

## 2020-01-09 RX ADMIN — CIPROFLOXACIN 250 MG: 500 TABLET, FILM COATED ORAL at 13:58

## 2020-01-09 RX ADMIN — VANCOMYCIN HYDROCHLORIDE 250 MG: KIT at 05:25

## 2020-01-09 RX ADMIN — Medication 10 ML: at 08:57

## 2020-01-09 RX ADMIN — ASPIRIN 81 MG: 81 TABLET, COATED ORAL at 08:55

## 2020-01-09 RX ADMIN — VANCOMYCIN HYDROCHLORIDE 250 MG: KIT at 13:57

## 2020-01-09 RX ADMIN — VANCOMYCIN HYDROCHLORIDE 250 MG: KIT at 03:32

## 2020-01-09 RX ADMIN — ENOXAPARIN SODIUM 30 MG: 30 INJECTION SUBCUTANEOUS at 08:55

## 2020-01-09 RX ADMIN — NIFEDIPINE 30 MG: 30 TABLET, EXTENDED RELEASE ORAL at 08:55

## 2020-01-09 RX ADMIN — VANCOMYCIN HYDROCHLORIDE 250 MG: KIT at 17:53

## 2020-01-09 RX ADMIN — Medication 1 CAPSULE: at 08:55

## 2020-01-09 RX ADMIN — QUETIAPINE FUMARATE 12.5 MG: 25 TABLET ORAL at 08:56

## 2020-01-09 RX ADMIN — FLUOXETINE 10 MG: 10 CAPSULE ORAL at 08:56

## 2020-01-09 RX ADMIN — HYDRALAZINE HYDROCHLORIDE 10 MG: 20 INJECTION INTRAMUSCULAR; INTRAVENOUS at 05:31

## 2020-01-09 RX ADMIN — METOPROLOL SUCCINATE 25 MG: 25 TABLET, FILM COATED, EXTENDED RELEASE ORAL at 13:57

## 2020-01-09 ASSESSMENT — PAIN SCALES - PAIN ASSESSMENT IN ADVANCED DEMENTIA (PAINAD)
CONSOLABILITY: 0
CONSOLABILITY: 0
BODYLANGUAGE: 0
FACIALEXPRESSION: 0
BREATHING: 0
BODYLANGUAGE: 0
BREATHING: 0
BREATHING: 0
NEGVOCALIZATION: 0
TOTALSCORE: 0
BREATHING: 0
CONSOLABILITY: 0
BREATHING: 0
NEGVOCALIZATION: 0
FACIALEXPRESSION: 0
BODYLANGUAGE: 0
NEGVOCALIZATION: 0
TOTALSCORE: 0
FACIALEXPRESSION: 0
BREATHING: 0
TOTALSCORE: 0
BODYLANGUAGE: 0
BODYLANGUAGE: 0
BREATHING: 0
TOTALSCORE: 0
NEGVOCALIZATION: 0
TOTALSCORE: 0
FACIALEXPRESSION: 0
NEGVOCALIZATION: 0
TOTALSCORE: 0
FACIALEXPRESSION: 0
BODYLANGUAGE: 0
FACIALEXPRESSION: 0
CONSOLABILITY: 0
NEGVOCALIZATION: 0
CONSOLABILITY: 0
TOTALSCORE: 0
FACIALEXPRESSION: 0
CONSOLABILITY: 0
BODYLANGUAGE: 0
CONSOLABILITY: 0
NEGVOCALIZATION: 0

## 2020-01-09 NOTE — CARE COORDINATION
Discharge Plan:      Patient discharged TO FACILITY: [address]  PHONE: Jose C Rosas3: [phone]  SW/DC Planner faxed, [address] [facility] and [facility]   Narcotic Prescriptions faxed were: not applicable  RN: Tyler Salas will call report      Medical Transport with: [facility]  [phone]   time: 5:30 PM  Family advised of discharge?:   HENS Submitted?:  N/A    All discharge needs met per case management.   DEMARCUS Wiley, [address]  [phone]

## 2020-01-09 NOTE — DISCHARGE INSTR - COC
Continuity of Care Form    Patient Name: Valerie Del Rosario   :  1930  MRN:  [id number]    Admit date:  2020  Discharge date:  2020    Code Status Order: [facility]   Advance Directives:   [facility] Documentation     Date/Time Healthcare Directive Type of Healthcare Directive Copy in [address] Agent's Name Healthcare Agent's Phone Number    [phone]  Yes, patient has an advance directive for healthcare treatment  Durable power of  for health care  Yes, copy in chart  Healthcare power of   Lauro Arroyo  [phone]          Admitting Physician:  Jearl Gitelman, MD  PCP: No primary care provider on file. Discharging Nurse: [facility] Unit/Room#: I7S-6675/1388-21  Discharging Unit Phone Number: ***    Emergency Contact:   Extended Emergency Contact Information  Primary Emergency Contact: Maycol [address] [place] [address] Phone: [phone]  Relation: Niece/Nephew    Past Surgical History:  No past surgical history on file. Immunization History: There is no immunization history on file for this patient.     Active Problems:  Patient Active Problem List   Diagnosis Code    Uncontrolled hypertension I10    Hypertensive urgency I16.0    Acute CVA (cerebrovascular accident) ([facility] [address] I63.9    Encephalopathy G93.40       Isolation/Infection:   Isolation          C Diff Contact        Patient Infection Status     None to display          Nurse Assessment:  Last Vital Signs: BP (!) 141/63   Pulse 80   Temp 98.2 °F (36.8 °C) (Axillary)   Resp 16   Ht 5' 2\" (1.575 m)   Wt 101 lb 12.8 oz (46.2 kg)   SpO2 97%   BMI 18.62 kg/m²     Last documented pain score (0-10 scale):    Last Weight:   Wt Readings from Last 1 Encounters:   20 101 lb 12.8 oz (46.2 kg)     Mental Status:  disoriented    IV Access:  - None    Nursing Mobility/ADLs:  Walking   Assisted  Transfer  Assisted  Bathing  Assisted  Dressing Assisted  Toileting  Assisted  Feeding  Dependent  Med Admin  Dependent  Med Delivery   prefers mixed with apple sauce and ***    Wound Care Documentation and Therapy:        Elimination:  Continence:   · Bowel no  · Bladder: No  Urinary Catheter: None   Colostomy/Ileostomy/Ileal Conduit: No       Date of Last BM: 01/08    Intake/Output Summary (Last 24 hours) at 1/9/2020 1331  Last data filed at 1/9/2020 0909  Gross per 24 hour   Intake 120 ml   Output --   Net 120 ml     No intake/output data recorded. Safety Concerns: At Risk for Falls    Impairments/Disabilities:      language    Nutrition Therapy:  Current Nutrition Therapy:   - Oral Diet:  General    Routes of Feeding: Oral  Liquids: Thin Liquids  Daily Fluid Restriction: no  Last Modified Barium Swallow with Video (Video Swallowing Test): not done    Treatments at the Time of Hospital Discharge:   Respiratory Treatments:   Oxygen Therapy:  is not on home oxygen therapy.   Ventilator:    - No ventilator support    Rehab Therapies: Physical Therapy  Weight Bearing Status/Restrictions: No weight bearing restirctions  Other Medical Equipment (for information only, NOT a DME order):  walker  Other Treatments:     Patient's personal belongings (please select all that are sent with patient):  None    RN SIGNATURE:  Electronically signed by Chang Infante RN on 1/9/20 at 3:31 PM    CASE MANAGEMENT/SOCIAL WORK SECTION    Inpatient Status Date: 01/09/2020    Readmission Risk Assessment Score:  Readmission Risk              Risk of Unplanned Readmission:        18           Discharging [facility] Facility/ Agency   · [facility]  · [facility]  · Phone:[phone]  · Fax:[phone]      / signature: Electronically signed by DEMARCUS Trammell on 1/9/2020 at 1:41 PM    PHYSICIAN SECTION    Prognosis: Good    Condition at Discharge: Stable    Rehab Potential (if transferring to Rehab): Good    Recommended Labs or Other Treatments After Discharge:  Cont vancomycin po course to finish previous course    Physician Certification: I certify the above information and transfer of Jacinda Méndez  is necessary for the continuing treatment of the diagnosis listed and that she requires [place] for less 30 days.      Update Admission H&P: No change in H&P    PHYSICIAN SIGNATURE:  Electronically signed by Yris Salomon MD on 1/9/20 at 1:32 PM

## 2020-01-09 NOTE — DISCHARGE SUMMARY
Hospital Medicine Discharge Summary    Patient ID: South Weber      Patient's PCP: No primary care provider on file. Admit Date: 1/7/2020     Discharge Date:   1/9/20     Admitting Physician: Tony Julian MD     Discharge Physician: Tim López MD     Discharge Diagnoses: Active Hospital Problems    Diagnosis    Encephalopathy [G93.40]    Acute CVA (cerebrovascular accident) [facility]) [I63.9]       The patient was seen and examined on day of discharge and this discharge summary is in conjunction with any daily progress note from day of discharge. History of Present Illness:  [place] is a 80 y.o. female is not a good historian secondary to dementia and speech abnormality most of this is obtained from Alberto Muhammad . The ER physician     Maria Ines Galan is a 80 y.o. female who presents to the emergency department for status changes.  This is an 77-year-old female with history of dementia who presents from [facility] facility with increased confusion.  Apparently they thought that the patient's right side of her mouth was drooping and her words were more slurred.  It is not clear when this started but they think it started this morning around 8:00 AM or so.  We do not know the patient's last normal was.  The patient has a history of dementia and is difficult to get a good history.     CT of the head was negative for acute disease CTA of the head and neck was also negative for acute disease received aspirin     C. difficile colitis currently does not have diarrhea on p.o. vancomycin         Hospital Course:     Acute on chronic encephalopathy: sec to UTI. .  CT of the head negative for hemorrhage. CTA of the head was negative acute disease. MRI brain showed no acute stroke. Small old lacunar infarct noted along with degenerative changes. Neurology consulted. Echocardiogram showed moderate aortic stenosis and  preserved ejection fraction.    UTI was treated.  Antihypertensives added for better control of blood pressure. Uncontrolled hypertension: Procardia continued . Toprol-XL was added  for better BP control.      UTI: cx- enterobacter- treated with cipro    C. difficile colitis: Recently diagnosed at nursing home(1/3/19). Course was continued while inpt        Dementia    Moderate AS : noted on echo. CKD stage 3 (GFR 30-59): Stable              Physical Exam Performed:     BP (!) 141/63   Pulse 80   Temp 98.2 °F (36.8 °C) (Axillary)   Resp 16   Ht 5' 2\" (1.575 m)   Wt 101 lb 12.8 oz (46.2 kg)   SpO2 97%   BMI 18.62 kg/m²       General appearance:  No apparent distress, appears stated age and cooperative. HEENT:  Normal cephalic, atraumatic without obvious deformity. Pupils equal, round, and reactive to light. Extra ocular muscles intact. Conjunctivae/corneas clear. Neck: Supple, with full range of motion. No jugular venous distention. Trachea midline. Respiratory:  Normal respiratory effort. Clear to auscultation, bilaterally without Rales/Wheezes/Rhonchi. Cardiovascular:  Regular rate and rhythm with normal S1/S2 ; ESM noted in aortic area, rubs or gallops. Abdomen: Soft, non-tender, non-distended with normal bowel sounds. Musculoskeletal:  No clubbing, cyanosis or edema bilaterally. Full range of motion without deformity. Skin: Skin color, texture, turgor normal.  No rashes or lesions. Neurologic:  Neurovascularly intact without any focal sensory/motor deficits. Cranial nerves: II-XII intact, grossly non-focal.  Psychiatric:  Alert and oriented to place and person. Capillary Refill: Brisk,< 3 seconds   Peripheral Pulses: +2 palpable, equal bilaterally       Labs:  For convenience and continuity at follow-up the following most recent labs are provided:      CBC:    Lab Results   Component Value Date    WBC 4.9 01/08/2020    HGB 9.5 01/08/2020    HCT 28.7 01/08/2020     01/08/2020       Renal:    Lab Results   Component Value Date     01/09/2020    K 4.4 01/09/2020    K 4.2 09/29/2019     01/09/2020    CO2 22 01/09/2020    BUN 32 01/09/2020    CREATININE 1.4 01/09/2020    CALCIUM 8.8 01/09/2020         Significant Diagnostic Studies    Radiology:   MRI brain without contrast   Final Result   1. No acute intracranial abnormality. Specifically, no acute infarction. 2. Mild diffuse brain parenchymal volume loss. Sequela of severe chronic   microvascular ischemic changes. 3. Fluid in the right mastoid air cells. XR CHEST PORTABLE   Final Result   Findings suggest congestive heart failure         CTA HEAD NECK W CONTRAST   Final Result   1. No intracranial proximal large artery focal high-grade stenosis or   occlusion. Atherosclerotic irregularity throughout the proximal large   intracranial arteries. 2.  No hemodynamically significant stenosis in the bilateral cervical   internal carotid arteries per NASCET criteria. Bilateral carotid bulb   atherosclerotic plaque. 3.  Patent bilateral vertebral arteries. Critical results were called by Dr. Lashell Demarco Sessions to DR [address] [place] [facility] on 1/7/2020 at 15:08. CT Head WO Contrast   Final Result   1. No acute intracranial abnormality. 2. Stable age-appropriate atrophy and chronic small vessel ischemic white   matter disease. 3. Chronic lacunar infarct within the left thalamus. 4. Chronic right mastoid disease. Findings were discussed with Dr. Brian Stovall at 2:43 pm on 1/7/2020.                 Consults:     IP CONSULT TO HOSPITALIST  IP CONSULT TO NEUROLOGY    Disposition:  SNF     Condition at Discharge: Stable    Discharge Instructions/Follow-up:  snf physician     Code Status:  DNR-CC     Activity: activity as tolerated    Diet: regular diet      Discharge Medications:     Current Discharge Medication List           Details   aspirin 81 MG EC tablet Take 1 tablet by mouth daily  Qty: 30 tablet, Refills: 3      atorvastatin

## 2020-01-09 NOTE — PLAN OF CARE
Problem: Falls - Risk of:  Goal: Will remain free from falls  Description  Will remain free from falls  Outcome: Met This Shift  Goal: Absence of physical injury  Description  Absence of physical injury  Outcome: Met This Shift     Problem: Risk for Impaired Skin Integrity  Goal: Tissue integrity - skin and mucous membranes  Description  Structural intactness and normal physiological function of skin and  mucous membranes.   Outcome: Met This Shift     Problem: Neurological  Goal: Maximum potential motor/sensory/cognitive function  Outcome: Ongoing